# Patient Record
Sex: FEMALE | Race: WHITE | NOT HISPANIC OR LATINO | Employment: UNEMPLOYED | URBAN - METROPOLITAN AREA
[De-identification: names, ages, dates, MRNs, and addresses within clinical notes are randomized per-mention and may not be internally consistent; named-entity substitution may affect disease eponyms.]

---

## 2020-11-09 ENCOUNTER — TELEPHONE (OUTPATIENT)
Dept: ENDOCRINOLOGY | Facility: CLINIC | Age: 60
End: 2020-11-09

## 2020-11-09 RX ORDER — LEVOTHYROXINE SODIUM 88 UG/1
88 TABLET ORAL DAILY
Qty: 30 TABLET | Refills: 0 | Status: SHIPPED | OUTPATIENT
Start: 2020-11-09 | End: 2020-11-13 | Stop reason: SDUPTHER

## 2020-11-09 RX ORDER — LEVOTHYROXINE SODIUM 88 UG/1
88 TABLET ORAL DAILY
COMMUNITY
Start: 2020-08-05 | End: 2020-11-09 | Stop reason: SDUPTHER

## 2020-11-09 NOTE — TELEPHONE ENCOUNTER
PATIENT IS REQUESTING REFILL ON HER THYROID MEDICATION. SHE DOES NOT KNOW THE NAME OR DOSE OF THIS MEDICATION. PLEASE SEND TO [facility] [place].

## 2020-11-13 ENCOUNTER — OFFICE VISIT (OUTPATIENT)
Dept: ENDOCRINOLOGY | Facility: CLINIC | Age: 60
End: 2020-11-13

## 2020-11-13 VITALS
DIASTOLIC BLOOD PRESSURE: 78 MMHG | TEMPERATURE: 98 F | SYSTOLIC BLOOD PRESSURE: 144 MMHG | OXYGEN SATURATION: 98 % | BODY MASS INDEX: 32.92 KG/M2 | HEIGHT: 63 IN | HEART RATE: 85 BPM | WEIGHT: 185.8 LBS

## 2020-11-13 DIAGNOSIS — E11.65 UNCONTROLLED TYPE 2 DIABETES MELLITUS WITH HYPERGLYCEMIA (HCC): Primary | ICD-10-CM

## 2020-11-13 DIAGNOSIS — I10 BENIGN HYPERTENSION: ICD-10-CM

## 2020-11-13 DIAGNOSIS — Z79.4 INSULIN LONG-TERM USE (HCC): ICD-10-CM

## 2020-11-13 PROBLEM — E78.00 HYPERCHOLESTEREMIA: Status: ACTIVE | Noted: 2020-11-13

## 2020-11-13 PROBLEM — E03.9 HYPOTHYROIDISM (ACQUIRED): Status: ACTIVE | Noted: 2020-11-13

## 2020-11-13 LAB
EXPIRATION DATE: NORMAL
HBA1C MFR BLD: 8.3 %
Lab: NORMAL

## 2020-11-13 PROCEDURE — 83036 HEMOGLOBIN GLYCOSYLATED A1C: CPT | Performed by: INTERNAL MEDICINE

## 2020-11-13 PROCEDURE — 99213 OFFICE O/P EST LOW 20 MIN: CPT | Performed by: INTERNAL MEDICINE

## 2020-11-13 RX ORDER — INSULIN LISPRO 100 [IU]/ML
INJECTION, SOLUTION INTRAVENOUS; SUBCUTANEOUS
COMMUNITY
Start: 2020-09-09 | End: 2021-05-10 | Stop reason: SDUPTHER

## 2020-11-13 RX ORDER — AMLODIPINE BESYLATE 10 MG/1
10 TABLET ORAL DAILY
COMMUNITY
Start: 2020-09-11 | End: 2021-06-15 | Stop reason: SDUPTHER

## 2020-11-13 RX ORDER — INSULIN GLARGINE 100 [IU]/ML
INJECTION, SOLUTION SUBCUTANEOUS
COMMUNITY
Start: 2020-08-12 | End: 2020-11-23 | Stop reason: SDUPTHER

## 2020-11-13 RX ORDER — ASPIRIN 81 MG/1
81 TABLET ORAL DAILY
COMMUNITY

## 2020-11-13 RX ORDER — EMPAGLIFLOZIN 25 MG/1
1 TABLET, FILM COATED ORAL EVERY MORNING
COMMUNITY
Start: 2020-08-28 | End: 2020-12-03 | Stop reason: SDUPTHER

## 2020-11-13 RX ORDER — LISINOPRIL 40 MG/1
40 TABLET ORAL DAILY
COMMUNITY
Start: 2020-10-19 | End: 2021-01-18 | Stop reason: SDUPTHER

## 2020-11-13 RX ORDER — ROSUVASTATIN CALCIUM 10 MG/1
10 TABLET, COATED ORAL DAILY
COMMUNITY
Start: 2020-10-01 | End: 2021-07-02 | Stop reason: SDUPTHER

## 2020-11-13 RX ORDER — PEN NEEDLE, DIABETIC 32GX 5/32"
NEEDLE, DISPOSABLE MISCELLANEOUS 4 TIMES DAILY
COMMUNITY
Start: 2020-09-22 | End: 2021-03-16 | Stop reason: SDUPTHER

## 2020-11-13 RX ORDER — LEVOTHYROXINE SODIUM 88 UG/1
88 TABLET ORAL DAILY
Qty: 90 TABLET | Refills: 3 | Status: SHIPPED | OUTPATIENT
Start: 2020-11-13 | End: 2020-12-03 | Stop reason: SDUPTHER

## 2020-11-13 NOTE — ASSESSMENT & PLAN NOTE
Diabetes is unchanged.   Continue current treatment regimen.  Diabetes will be reassessed in 3 months.    A1c above goal.  Fasting FSBS okay.  Be more aggressive with mealtime insulin.

## 2020-11-13 NOTE — ASSESSMENT & PLAN NOTE
Hypertension is unchanged.  Continue current treatment regimen.  Blood pressure will be reassessed in 3 months.    BP borderline.  Check BP at home.

## 2020-11-13 NOTE — PROGRESS NOTES
"     Office Note      Date: 2020  Patient Name: Carmen Nava  MRN: [id number]  : 1960    Chief Complaint   Patient presents with   • Follow-up   • Diabetes   • Thyroid Problem       History of Present Illness:   Carmen Nava is a 59 y.o. female who presents for Diabetes type 2. Diagnosed in: . Treated in past with oral agents. Current treatments: jardiance and basal bolus insulin. Number of insulin shots per day: 4. Checks blood sugar 3 times a day. Has low blood sugar: no. Aspirin use: Yes. Statin use: Yes. ACE-I/ARB use: Yes. Changes in health since last visit: none. Last eye exam 2018.    Subjective      Diabetic Complications:  Eyes: No  Kidneys: No  Feet: No  Heart: No    Diet and Exercise:  Meals per day: 3  Minutes of exercise per week: 0 mins.    Review of Systems:   Review of Systems   Constitutional: Negative.    Cardiovascular: Negative.    Gastrointestinal: Negative.    Endocrine: Negative.        The following portions of the patient's history were reviewed and updated as appropriate: allergies, current medications, past family history, past medical history, past social history, past surgical history and problem list.    Objective       Visit Vitals  /78   Pulse 85   Temp 98 °F (36.7 °C) (Infrared)   Ht 160 cm (63\")   Wt 84.3 kg (185 lb 12.8 oz)   LMP 2014 (Approximate)   SpO2 98%   BMI 32.91 kg/m²       Physical Exam:  Physical Exam  Constitutional:       Appearance: Normal appearance.   Neurological:      Mental Status: She is alert.         Labs:    HbA1c  Lab Results   Component Value Date    HGBA1C 8.3 2020       CMP  No results found for: GLUCOSE, BUN, CREATININE, EGFRIFNONA, EGFRIFAFRI, BCR, K, CO2, CALCIUM, PROTENTOTREF, LABIL2, BILIRUBIN, AST, ALT     Lipid Panel        TSH  No results found for: TSH, FREET4     Hemoglobin A1C  Lab Results   Component Value Date    HGBA1C 8.3 2020        Microalbumin/Creatinine  No results found for: PRABHAKAR " CREATINIURIN, MICROALBUR        Assessment / Plan      Assessment & Plan:  Problem List Items Addressed This Visit        Cardiovascular and Mediastinum    Benign hypertension    Current Assessment & Plan     Hypertension is unchanged.  Continue current treatment regimen.  Blood pressure will be reassessed in 3 months.    BP borderline.  Check BP at home.         Relevant Medications    amLODIPine (NORVASC) 10 MG tablet    lisinopril (PRINIVIL,ZESTRIL) 40 MG tablet       Endocrine    Uncontrolled type 2 diabetes mellitus with hyperglycemia (CMS/HCC) - Primary    Current Assessment & Plan     Diabetes is unchanged.   Continue current treatment regimen.  Diabetes will be reassessed in 3 months.    A1c above goal.  Fasting FSBS okay.  Be more aggressive with mealtime insulin.         Relevant Medications    Jardiance 25 MG tablet    Lantus SoloStar 100 UNIT/ML injection pen    HumaLOG KwikPen 100 UNIT/ML solution pen-injector    Other Relevant Orders    POC Glycosylated Hemoglobin (Hb A1C) (Completed)       Other    Insulin long-term use (CMS/HCC)           Return in about 3 months (around 2/13/2021) for Recheck with A1c, TSH.    Julian Miranda MD   11/13/2020

## 2020-11-23 RX ORDER — INSULIN GLARGINE 100 [IU]/ML
25 INJECTION, SOLUTION SUBCUTANEOUS NIGHTLY
Qty: 8 ML | Refills: 2 | Status: SHIPPED | OUTPATIENT
Start: 2020-11-23 | End: 2021-02-10 | Stop reason: SDUPTHER

## 2020-12-03 RX ORDER — EMPAGLIFLOZIN 25 MG/1
1 TABLET, FILM COATED ORAL EVERY MORNING
Qty: 30 TABLET | Refills: 5 | Status: SHIPPED | OUTPATIENT
Start: 2020-12-03 | End: 2021-03-19 | Stop reason: SINTOL

## 2020-12-03 RX ORDER — LEVOTHYROXINE SODIUM 88 UG/1
88 TABLET ORAL DAILY
Qty: 90 TABLET | Refills: 3 | Status: SHIPPED | OUTPATIENT
Start: 2020-12-03 | End: 2021-03-22 | Stop reason: DRUGHIGH

## 2020-12-03 NOTE — TELEPHONE ENCOUNTER
Pt called needing a refill on Euthyrox 88 mcg, Jardiance 25 mg. Pt last seen 11/13/20 pt next appt 03/19/21

## 2021-01-19 RX ORDER — LISINOPRIL 40 MG/1
40 TABLET ORAL DAILY
Qty: 90 TABLET | Refills: 3 | Status: SHIPPED | OUTPATIENT
Start: 2021-01-19 | End: 2022-01-20

## 2021-02-10 RX ORDER — INSULIN GLARGINE 100 [IU]/ML
28 INJECTION, SOLUTION SUBCUTANEOUS DAILY
Qty: 3 PEN | Refills: 2 | Status: SHIPPED | OUTPATIENT
Start: 2021-02-10 | End: 2021-04-30 | Stop reason: SDUPTHER

## 2021-03-16 RX ORDER — PEN NEEDLE, DIABETIC 32GX 5/32"
1 NEEDLE, DISPOSABLE MISCELLANEOUS 4 TIMES DAILY
Qty: 400 EACH | Refills: 0 | Status: SHIPPED | OUTPATIENT
Start: 2021-03-16 | End: 2021-11-08

## 2021-03-19 ENCOUNTER — OFFICE VISIT (OUTPATIENT)
Dept: ENDOCRINOLOGY | Facility: CLINIC | Age: 61
End: 2021-03-19

## 2021-03-19 ENCOUNTER — LAB (OUTPATIENT)
Dept: LAB | Facility: HOSPITAL | Age: 61
End: 2021-03-19

## 2021-03-19 ENCOUNTER — TELEPHONE (OUTPATIENT)
Dept: ENDOCRINOLOGY | Facility: CLINIC | Age: 61
End: 2021-03-19

## 2021-03-19 VITALS
DIASTOLIC BLOOD PRESSURE: 74 MMHG | SYSTOLIC BLOOD PRESSURE: 140 MMHG | HEIGHT: 63 IN | WEIGHT: 187 LBS | TEMPERATURE: 96.9 F | HEART RATE: 80 BPM | BODY MASS INDEX: 33.13 KG/M2

## 2021-03-19 DIAGNOSIS — E03.9 HYPOTHYROIDISM (ACQUIRED): ICD-10-CM

## 2021-03-19 DIAGNOSIS — E11.65 UNCONTROLLED TYPE 2 DIABETES MELLITUS WITH HYPERGLYCEMIA (HCC): Primary | ICD-10-CM

## 2021-03-19 DIAGNOSIS — Z79.4 INSULIN LONG-TERM USE (HCC): ICD-10-CM

## 2021-03-19 DIAGNOSIS — I10 BENIGN HYPERTENSION: ICD-10-CM

## 2021-03-19 LAB
EXPIRATION DATE: NORMAL
HBA1C MFR BLD: 8.2 %
Lab: NORMAL

## 2021-03-19 PROCEDURE — 83036 HEMOGLOBIN GLYCOSYLATED A1C: CPT | Performed by: INTERNAL MEDICINE

## 2021-03-19 PROCEDURE — 84443 ASSAY THYROID STIM HORMONE: CPT

## 2021-03-19 PROCEDURE — 99214 OFFICE O/P EST MOD 30 MIN: CPT | Performed by: INTERNAL MEDICINE

## 2021-03-19 RX ORDER — SEMAGLUTIDE 1.34 MG/ML
0.5 INJECTION, SOLUTION SUBCUTANEOUS WEEKLY
Qty: 1.5 ML | Refills: 5 | Status: SHIPPED | OUTPATIENT
Start: 2021-03-19 | End: 2021-07-21 | Stop reason: DRUGHIGH

## 2021-03-19 RX ORDER — INSULIN GLARGINE 100 [IU]/ML
INJECTION, SOLUTION SUBCUTANEOUS
COMMUNITY
End: 2021-12-06 | Stop reason: SDUPTHER

## 2021-03-19 NOTE — PROGRESS NOTES
"     Office Note      Date: 2021  Patient Name: Carmen Nava  MRN: [id number]  : 1960    Chief Complaint   Patient presents with   • Diabetes       History of Present Illness:   Carmen Nava is a 60 y.o. female who presents for Diabetes type 2. Diagnosed in: . Treated in past with oral agents. Current treatments: jardiance and basal bolus insulin. Number of insulin shots per day: 4. Checks blood sugar 3 times a day. Has low blood sugar: rare. Aspirin use: Yes. Statin use: Yes. ACE-I/ARB use: Yes. Changes in health since last visit: none. Last eye exam 2018.    She remains on T4 88mcg qd.  She is taking this correctly.  She isn't taking any interfering meds concurrently.  She hasn't noted any change in the size of her neck.  She denies any compressive sxs.  She denies any sxs of hypo- or hyperthyroidism at this time.    Subjective      Diabetic Complications:  Eyes: No  Kidneys: No  Feet: No  Heart: No    Diet and Exercise:  Meals per day: 3  Minutes of exercise per week: 0 mins.    Review of Systems:   Review of Systems   Constitutional: Negative.    Cardiovascular: Negative.    Gastrointestinal: Negative.    Endocrine: Negative.        The following portions of the patient's history were reviewed and updated as appropriate: allergies, current medications, past family history, past medical history, past social history, past surgical history and problem list.    Objective       Visit Vitals  /74 (BP Location: Left arm, Patient Position: Sitting, Cuff Size: Adult)   Pulse 80   Temp 96.9 °F (36.1 °C) (Infrared)   Ht 160 cm (63\")   Wt 84.8 kg (187 lb)   LMP 2014 (Approximate)   BMI 33.13 kg/m²       Physical Exam:  Physical Exam  Constitutional:       Appearance: Normal appearance.   Neck:      Thyroid: No thyroid mass, thyromegaly or thyroid tenderness.   Lymphadenopathy:      Cervical: No cervical adenopathy.   Neurological:      Mental Status: She is alert.         Labs:    HbA1c  Lab " Results   Component Value Date    HGBA1C 8.2 03/19/2021       CMP  No results found for: GLUCOSE, BUN, CREATININE, EGFRIFNONA, EGFRIFAFRI, BCR, K, CO2, CALCIUM, PROTENTOTREF, LABIL2, BILIRUBIN, AST, ALT     Lipid Panel        TSH  No results found for: TSH, FREET4     Hemoglobin A1C  Lab Results   Component Value Date    HGBA1C 8.2 03/19/2021        Microalbumin/Creatinine  No results found for: MALBCRERATIO, CREATINIURIN, MICROALBUR        Assessment / Plan      Assessment & Plan:  Diagnoses and all orders for this visit:    1. Uncontrolled type 2 diabetes mellitus with hyperglycemia (CMS/[facility]) (Primary)  Assessment & Plan:  A1c unchanged.    Having yeast infections with Jardiance.  Will stop it.    We discussed GLP-1 RA tx.      Orders:  -     POC Glycosylated Hemoglobin (Hb A1C)    2. Benign hypertension  Assessment & Plan:  Hypertension is unchanged.  Continue current treatment regimen.  Blood pressure will be reassessed at the next regular appointment.      3. Hypothyroidism (acquired)  Assessment & Plan:  Continue T4.  Check TSH today.    Orders:  -     TSH; Future    4. Insulin long-term use (CMS/[facility])    Other orders  -     Semaglutide,0.25 or 0.5MG/DOS, (Ozempic, 0.25 or 0.5 MG/DOSE,) 2 MG/1.5ML solution pen-injector; Inject 0.5 mg under the skin into the appropriate area as directed 1 (One) Time Per Week.  Dispense: 1.5 mL; Refill: 5      Return in about 3 months (around 6/19/2021) for Recheck with A1c, CMP, lipids, TSH, microalbumin.    Julian Miranda MD   03/19/2021

## 2021-03-19 NOTE — TELEPHONE ENCOUNTER
PA Case: [id number], Status: Approved, Coverage Starts on: 3/19/2021 12:00:00 AM, Coverage Ends on: 3/19/2022 12:00:00 AM.

## 2021-03-19 NOTE — ASSESSMENT & PLAN NOTE
A1c unchanged.    Having yeast infections with Jardiance.  Will stop it.    We discussed GLP-1 RA tx.

## 2021-03-20 LAB — TSH SERPL DL<=0.05 MIU/L-ACNC: 4.3 UIU/ML (ref 0.27–4.2)

## 2021-03-22 RX ORDER — LEVOTHYROXINE SODIUM 0.1 MG/1
100 TABLET ORAL DAILY
Qty: 90 TABLET | Refills: 3 | Status: SHIPPED | OUTPATIENT
Start: 2021-03-22 | End: 2021-12-01 | Stop reason: DRUGHIGH

## 2021-04-30 RX ORDER — INSULIN GLARGINE 100 [IU]/ML
28 INJECTION, SOLUTION SUBCUTANEOUS DAILY
Qty: 26 ML | Refills: 1 | Status: SHIPPED | OUTPATIENT
Start: 2021-04-30 | End: 2021-07-21 | Stop reason: SDUPTHER

## 2021-04-30 RX ORDER — INSULIN GLARGINE 100 [IU]/ML
INJECTION, SOLUTION SUBCUTANEOUS
Status: CANCELLED | OUTPATIENT
Start: 2021-04-30

## 2021-04-30 NOTE — TELEPHONE ENCOUNTER
PATIENT STATES THAT SHE IS NOW OUT OF MEDICATION. STATS THAT PHARMACY SENT REFILL REQUEST PREVIOUSLY.

## 2021-05-10 RX ORDER — INSULIN LISPRO 100 [IU]/ML
24 INJECTION, SOLUTION INTRAVENOUS; SUBCUTANEOUS
Qty: 24 ML | Refills: 1 | Status: SHIPPED | OUTPATIENT
Start: 2021-05-10 | End: 2021-07-12 | Stop reason: SDUPTHER

## 2021-05-10 RX ORDER — INSULIN LISPRO 100 [IU]/ML
24 INJECTION, SOLUTION INTRAVENOUS; SUBCUTANEOUS
Qty: 24 ML | Refills: 1 | Status: SHIPPED | OUTPATIENT
Start: 2021-05-10 | End: 2021-05-10 | Stop reason: SDUPTHER

## 2021-05-10 NOTE — TELEPHONE ENCOUNTER
PT CALLED REQUESTING A REFILL OF HUMALOG TO BE SENT IN TO [facility]. PLEASE AND THANK YOU

## 2021-06-15 RX ORDER — AMLODIPINE BESYLATE 10 MG/1
10 TABLET ORAL DAILY
Qty: 90 TABLET | Refills: 2 | Status: SHIPPED | OUTPATIENT
Start: 2021-06-15 | End: 2022-01-17

## 2021-07-02 RX ORDER — ROSUVASTATIN CALCIUM 10 MG/1
10 TABLET, COATED ORAL DAILY
Qty: 90 TABLET | Refills: 0 | Status: SHIPPED | OUTPATIENT
Start: 2021-07-02 | End: 2021-09-30

## 2021-07-12 RX ORDER — INSULIN LISPRO 100 [IU]/ML
24 INJECTION, SOLUTION INTRAVENOUS; SUBCUTANEOUS
Qty: 24 ML | Refills: 5 | Status: SHIPPED | OUTPATIENT
Start: 2021-07-12 | End: 2021-07-21 | Stop reason: SDUPTHER

## 2021-07-12 NOTE — TELEPHONE ENCOUNTER
PT called back and said [facility] in [place] still doesn't have the Rx and she is in urgent need of insulin.    [facility] [place] pharmacy: [phone]

## 2021-07-13 NOTE — TELEPHONE ENCOUNTER
HumaLOG KwikPen 100 UNIT/ML solution pen-injector [[id number]]     Order Details  Dose: 24 Units Route: Subcutaneous Frequency: 3 Times Daily With Meals   Dispense Quantity: 24 mL Refills: 5          Sig: Inject 24 Units under the skin into the appropriate area as directed 3 (Three) Times a Day With Meals.         Start Date: 07/12/21 End Date: --   Written Date: 07/12/21 Expiration Date: 07/12/22   Original Order:  HumaLOG KwikPen 100 UNIT/ML solution pen-injector [[id number]]   Providers    Ordering Provider and Authorizing Provider:    Luba Russ PA   [address] [place] [address]   Phone:  [phone]   Fax:  [phone]   NPI:  [id number]        Ordering User:  Luba Russ PA        Pharmacy    [address] [phone] [place] [id number] [address] [address]   Phone:  [phone]  Fax:  [phone]   Orders with any of the following pharmaceutical classes: Antidiabetic    Name Dose Frequency Start Date End Date Medication Warnings Interventions? Order Mode    Insulin Glargine (Lantus SoloStar) 100 UNIT/ML injection pen       Outpatient    Semaglutide,0.25 or 0.5MG/DOS, (Ozempic, 0.25 or 0.5 MG/DOSE,) 2 MG/1.5ML solution pen-injector 0.5 mg Weekly 03/19/21    Outpatient    HumaLOG KwikPen 100 UNIT/ML solution pen-injector 24 Units 3 Times Daily With Meals 05/10/21 07/12/21 Dose  Outpatient    Warnings History    Total number of overridden warnings: 2   Full Warnings History   Pharmacist Clinical Review History    This prescription has not been clinically reviewed.   Order Reconciliation Actions       Order Reconciliation Actions   E-Prescribing Status    Outpatient Medication Detail    HumaLOG KwikPen 100 UNIT/ML solution pen-injector        Sig: Inject 24 Units under the skin into the appropriate area as directed 3 (Three) Times a Day With Meals.        Sent to pharmacy as: HumaLOG KwikPen 100  UNIT/ML Subcutaneous Solution Pen-injector        Class: Normal        Route: Subcutaneous        E-Prescribing Status: Receipt confirmed by pharmacy (7/12/2021  6:01 PM EDT)

## 2021-07-21 ENCOUNTER — OFFICE VISIT (OUTPATIENT)
Dept: ENDOCRINOLOGY | Facility: CLINIC | Age: 61
End: 2021-07-21

## 2021-07-21 ENCOUNTER — LAB (OUTPATIENT)
Dept: LAB | Facility: HOSPITAL | Age: 61
End: 2021-07-21

## 2021-07-21 VITALS
HEART RATE: 87 BPM | HEIGHT: 63 IN | WEIGHT: 182.4 LBS | OXYGEN SATURATION: 98 % | DIASTOLIC BLOOD PRESSURE: 76 MMHG | BODY MASS INDEX: 32.32 KG/M2 | SYSTOLIC BLOOD PRESSURE: 134 MMHG

## 2021-07-21 DIAGNOSIS — Z79.4 INSULIN LONG-TERM USE (HCC): ICD-10-CM

## 2021-07-21 DIAGNOSIS — I10 BENIGN HYPERTENSION: ICD-10-CM

## 2021-07-21 DIAGNOSIS — E11.65 UNCONTROLLED TYPE 2 DIABETES MELLITUS WITH HYPERGLYCEMIA (HCC): ICD-10-CM

## 2021-07-21 DIAGNOSIS — E11.65 UNCONTROLLED TYPE 2 DIABETES MELLITUS WITH HYPERGLYCEMIA (HCC): Primary | ICD-10-CM

## 2021-07-21 DIAGNOSIS — E78.00 HYPERCHOLESTEREMIA: ICD-10-CM

## 2021-07-21 DIAGNOSIS — E03.9 HYPOTHYROIDISM (ACQUIRED): ICD-10-CM

## 2021-07-21 LAB
ALBUMIN SERPL-MCNC: 4 G/DL (ref 3.5–5.2)
ALBUMIN UR-MCNC: <1.2 MG/DL
ALBUMIN/GLOB SERPL: 1.4 G/DL
ALP SERPL-CCNC: 113 U/L (ref 39–117)
ALT SERPL W P-5'-P-CCNC: 10 U/L (ref 1–33)
ANION GAP SERPL CALCULATED.3IONS-SCNC: 9.8 MMOL/L (ref 5–15)
AST SERPL-CCNC: 10 U/L (ref 1–32)
BILIRUB SERPL-MCNC: 0.7 MG/DL (ref 0–1.2)
BUN SERPL-MCNC: 18 MG/DL (ref 8–23)
BUN/CREAT SERPL: 20.7 (ref 7–25)
CALCIUM SPEC-SCNC: 9.8 MG/DL (ref 8.6–10.5)
CHLORIDE SERPL-SCNC: 102 MMOL/L (ref 98–107)
CHOLEST SERPL-MCNC: 164 MG/DL (ref 0–200)
CO2 SERPL-SCNC: 24.2 MMOL/L (ref 22–29)
CREAT SERPL-MCNC: 0.87 MG/DL (ref 0.57–1)
CREAT UR-MCNC: 43.2 MG/DL
EXPIRATION DATE: ABNORMAL
EXPIRATION DATE: NORMAL
GFR SERPL CREATININE-BSD FRML MDRD: 66 ML/MIN/1.73
GLOBULIN UR ELPH-MCNC: 2.9 GM/DL
GLUCOSE BLDC GLUCOMTR-MCNC: 392 MG/DL (ref 70–130)
GLUCOSE SERPL-MCNC: 397 MG/DL (ref 65–99)
HBA1C MFR BLD: 7.9 %
HDLC SERPL-MCNC: 57 MG/DL (ref 40–60)
LDLC SERPL CALC-MCNC: 88 MG/DL (ref 0–100)
LDLC/HDLC SERPL: 1.51 {RATIO}
Lab: ABNORMAL
Lab: NORMAL
MICROALBUMIN/CREAT UR: NORMAL MG/G{CREAT}
POTASSIUM SERPL-SCNC: 4.8 MMOL/L (ref 3.5–5.2)
PROT SERPL-MCNC: 6.9 G/DL (ref 6–8.5)
SODIUM SERPL-SCNC: 136 MMOL/L (ref 136–145)
TRIGL SERPL-MCNC: 106 MG/DL (ref 0–150)
TSH SERPL DL<=0.05 MIU/L-ACNC: 0.13 UIU/ML (ref 0.27–4.2)
VLDLC SERPL-MCNC: 19 MG/DL (ref 5–40)

## 2021-07-21 PROCEDURE — 82947 ASSAY GLUCOSE BLOOD QUANT: CPT | Performed by: INTERNAL MEDICINE

## 2021-07-21 PROCEDURE — 83036 HEMOGLOBIN GLYCOSYLATED A1C: CPT | Performed by: INTERNAL MEDICINE

## 2021-07-21 PROCEDURE — 99214 OFFICE O/P EST MOD 30 MIN: CPT | Performed by: INTERNAL MEDICINE

## 2021-07-21 PROCEDURE — 82570 ASSAY OF URINE CREATININE: CPT

## 2021-07-21 PROCEDURE — 84443 ASSAY THYROID STIM HORMONE: CPT

## 2021-07-21 PROCEDURE — 80053 COMPREHEN METABOLIC PANEL: CPT

## 2021-07-21 PROCEDURE — 80061 LIPID PANEL: CPT

## 2021-07-21 PROCEDURE — 82043 UR ALBUMIN QUANTITATIVE: CPT

## 2021-07-21 RX ORDER — INSULIN LISPRO 100 [IU]/ML
24 INJECTION, SOLUTION INTRAVENOUS; SUBCUTANEOUS
Qty: 66 ML | Refills: 3 | Status: SHIPPED | OUTPATIENT
Start: 2021-07-21 | End: 2021-11-30

## 2021-07-21 RX ORDER — SEMAGLUTIDE 1.34 MG/ML
1 INJECTION, SOLUTION SUBCUTANEOUS WEEKLY
Qty: 9 ML | Refills: 3 | Status: SHIPPED | OUTPATIENT
Start: 2021-07-21 | End: 2022-07-14 | Stop reason: SDUPTHER

## 2021-07-21 NOTE — ASSESSMENT & PLAN NOTE
Diabetes is improving with treatment.   Continue current treatment regimen.  Titrate up ozempic.  Diabetes will be reassessed in 3 months.

## 2021-07-21 NOTE — PROGRESS NOTES
"     Office Note      Date: 2021  Patient Name: Carmen Nava  MRN: [id number]  : 1960    Chief Complaint   Patient presents with   • Diabetes       History of Present Illness:   Carmen Nava is a 60 y.o. female who presents for Diabetes type 2. Diagnosed in: . Treated in past with oral agents. Current treatments: ozempic and basal bolus insulin. Number of insulin shots per day: 4. Checks blood sugar 3 times a day. Has low blood sugar: rare. Aspirin use: Yes. Statin use: Yes. ACE-I/ARB use: Yes. Changes in health since last visit: none. Last eye exam 2018.     She remains on T4 100mcg qd.  She is taking this correctly.  She isn't taking any interfering meds concurrently.  She hasn't noted any change in the size of her neck.  She denies any compressive sxs.  She denies any sxs of hypo- or hyperthyroidism at this time.    Subjective      Diabetic Complications:  Eyes: No  Kidneys: No  Feet: No  Heart: No    Diet and Exercise:  Meals per day: 3  Minutes of exercise per week: 0 mins.    Review of Systems:   Review of Systems   Constitutional: Negative.    Cardiovascular: Negative.    Gastrointestinal: Negative.    Endocrine: Negative.        The following portions of the patient's history were reviewed and updated as appropriate: allergies, current medications, past family history, past medical history, past social history, past surgical history and problem list.    Objective       Visit Vitals  /76   Pulse 87   Ht 160 cm (63\")   Wt 82.7 kg (182 lb 6.4 oz)   LMP 2014 (Approximate)   SpO2 98%   BMI 32.31 kg/m²       Physical Exam:  Physical Exam  Constitutional:       Appearance: Normal appearance.   Cardiovascular:      Pulses:           Dorsalis pedis pulses are 2+ on the right side and 2+ on the left side.        Posterior tibial pulses are 2+ on the right side and 2+ on the left side.   Feet:      Right foot:      Protective Sensation: 5 sites tested. 5 sites sensed.      Skin " integrity: Skin integrity normal.      Toenail Condition: Right toenails are abnormally thick.      Left foot:      Protective Sensation: 5 sites tested. 5 sites sensed.      Skin integrity: Skin integrity normal.      Toenail Condition: Left toenails are abnormally thick.   Neurological:      Mental Status: She is alert.         Labs:    HbA1c  Lab Results   Component Value Date    HGBA1C 7.9 07/21/2021       CMP  Lab Results   Component Value Date    GLUCOSE 397 (H) 07/21/2021    BUN 18 07/21/2021    CREATININE 0.87 07/21/2021    EGFRIFNONA 66 07/21/2021    BCR 20.7 07/21/2021    K 4.8 07/21/2021    CO2 24.2 07/21/2021    CALCIUM 9.8 07/21/2021    AST 10 07/21/2021    ALT 10 07/21/2021        Lipid Panel  Lab Results   Component Value Date    HDL 57 07/21/2021    LDL 88 07/21/2021    TRIG 106 07/21/2021        TSH  Lab Results   Component Value Date    TSH 0.132 (L) 07/21/2021        Hemoglobin A1C  Lab Results   Component Value Date    HGBA1C 7.9 07/21/2021        Microalbumin/Creatinine  Lab Results   Component Value Date    MALBCRERATIO  07/21/2021      Comment:      Unable to calculate    MICROALBUR <1.2 07/21/2021           Assessment / Plan      Assessment & Plan:  Diagnoses and all orders for this visit:    1. Uncontrolled type 2 diabetes mellitus with hyperglycemia (CMS/[facility]) (Primary)  Assessment & Plan:  Diabetes is improving with treatment.   Continue current treatment regimen.  Titrate up ozempic.  Diabetes will be reassessed in 3 months.    Orders:  -     POC Glycosylated Hemoglobin (Hb A1C)  -     POC Glucose, Blood  -     Comprehensive Metabolic Panel; Future  -     Lipid Panel; Future  -     Microalbumin / Creatinine Urine Ratio - Urine, Clean Catch; Future  -     TSH; Future    2. Benign hypertension  Assessment & Plan:  Hypertension is improving with treatment.  Continue current treatment regimen.  Blood pressure will be reassessed at the next regular appointment.      3.  Hypercholesteremia  Assessment & Plan:  Continue statin.  Check lipids today.      4. Hypothyroidism (acquired)  Assessment & Plan:  Continue T4.  Check TSH today.      5. Insulin long-term use (CMS/HCC)    Other orders  -     Semaglutide, 1 MG/DOSE, (Ozempic, 1 MG/DOSE,) 2 MG/1.5ML solution pen-injector; Inject 1 mg under the skin into the appropriate area as directed 1 (One) Time Per Week.  Dispense: 9 mL; Refill: 3  -     HumaLOG KwikPen 100 UNIT/ML solution pen-injector; Inject 24 Units under the skin into the appropriate area as directed 3 (Three) Times a Day With Meals.  Dispense: 66 mL; Refill: 3      Return in about 3 months (around 10/21/2021) for Recheck with A1c, TSH.    Julian Miranda MD   07/21/2021

## 2021-09-30 RX ORDER — ROSUVASTATIN CALCIUM 10 MG/1
TABLET, COATED ORAL
Qty: 90 TABLET | Refills: 0 | Status: SHIPPED | OUTPATIENT
Start: 2021-09-30 | End: 2021-12-28

## 2021-11-08 RX ORDER — PEN NEEDLE, DIABETIC 32GX 5/32"
NEEDLE, DISPOSABLE MISCELLANEOUS
Qty: 400 EACH | Refills: 0 | Status: SHIPPED | OUTPATIENT
Start: 2021-11-08 | End: 2022-05-18 | Stop reason: SDUPTHER

## 2021-11-30 ENCOUNTER — OFFICE VISIT (OUTPATIENT)
Dept: ENDOCRINOLOGY | Facility: CLINIC | Age: 61
End: 2021-11-30

## 2021-11-30 ENCOUNTER — LAB (OUTPATIENT)
Dept: LAB | Facility: HOSPITAL | Age: 61
End: 2021-11-30

## 2021-11-30 VITALS
DIASTOLIC BLOOD PRESSURE: 68 MMHG | OXYGEN SATURATION: 98 % | HEIGHT: 63 IN | HEART RATE: 90 BPM | SYSTOLIC BLOOD PRESSURE: 136 MMHG | BODY MASS INDEX: 30.65 KG/M2 | WEIGHT: 173 LBS

## 2021-11-30 DIAGNOSIS — E78.00 HYPERCHOLESTEREMIA: ICD-10-CM

## 2021-11-30 DIAGNOSIS — E11.65 UNCONTROLLED TYPE 2 DIABETES MELLITUS WITH HYPERGLYCEMIA (HCC): Primary | ICD-10-CM

## 2021-11-30 DIAGNOSIS — E03.9 HYPOTHYROIDISM (ACQUIRED): ICD-10-CM

## 2021-11-30 DIAGNOSIS — Z79.4 INSULIN LONG-TERM USE (HCC): ICD-10-CM

## 2021-11-30 DIAGNOSIS — I10 BENIGN HYPERTENSION: ICD-10-CM

## 2021-11-30 LAB
EXPIRATION DATE: ABNORMAL
EXPIRATION DATE: NORMAL
GLUCOSE BLDC GLUCOMTR-MCNC: 238 MG/DL (ref 70–130)
HBA1C MFR BLD: 8.9 %
Lab: ABNORMAL
Lab: NORMAL

## 2021-11-30 PROCEDURE — 99214 OFFICE O/P EST MOD 30 MIN: CPT | Performed by: INTERNAL MEDICINE

## 2021-11-30 PROCEDURE — 82947 ASSAY GLUCOSE BLOOD QUANT: CPT | Performed by: INTERNAL MEDICINE

## 2021-11-30 PROCEDURE — 84443 ASSAY THYROID STIM HORMONE: CPT

## 2021-11-30 PROCEDURE — 83036 HEMOGLOBIN GLYCOSYLATED A1C: CPT | Performed by: INTERNAL MEDICINE

## 2021-11-30 RX ORDER — INSULIN LISPRO 100 [IU]/ML
26 INJECTION, SOLUTION INTRAVENOUS; SUBCUTANEOUS
Qty: 72 ML | Refills: 3 | Status: SHIPPED | OUTPATIENT
Start: 2021-11-30 | End: 2022-06-17 | Stop reason: SDUPTHER

## 2021-11-30 NOTE — ASSESSMENT & PLAN NOTE
Diabetes is worsening. Fasting FSBS not too bad but higher during the day.  Titrate up insulin.  Increase mealtime insulin.  Diabetes will be reassessed in 3 months.

## 2021-11-30 NOTE — PROGRESS NOTES
"     Office Note      Date: 2021  Patient Name: Carmen Nava  MRN: [id number]  : 1960    Chief Complaint   Patient presents with   • Diabetes       History of Present Illness:   Carmen Nava is a 61 y.o. female who presents for Diabetes type 2. Diagnosed in: . Treated in past with oral agents. Current treatments: ozempic and basal bolus insulin. Number of insulin shots per day: 4. Checks blood sugar 3 times a day. Has low blood sugar: rare. Aspirin use: Yes. Statin use: Yes. ACE-I/ARB use: Yes. Changes in health since last visit: none. Last eye exam 2018.     She remains on T4 100mcg qd.  She is taking this correctly.  She isn't taking any interfering meds concurrently.  She hasn't noted any change in the size of her neck.  She denies any compressive sxs.  She denies any sxs of hypo- or hyperthyroidism at this time.    Subjective      Diabetic Complications:  Eyes: No  Kidneys: No  Feet: No  Heart: No    Diet and Exercise:  Meals per day: 3  Minutes of exercise per week: 0 mins.    Review of Systems:   Review of Systems   Constitutional: Negative.    Cardiovascular: Negative.    Gastrointestinal: Negative.    Endocrine: Negative.        The following portions of the patient's history were reviewed and updated as appropriate: allergies, current medications, past family history, past medical history, past social history, past surgical history and problem list.    Objective       Visit Vitals  /68 (BP Location: Left arm, Patient Position: Sitting, Cuff Size: Adult)   Pulse 90   Ht 160 cm (63\")   Wt 78.5 kg (173 lb)   LMP 2014 (Approximate)   SpO2 98%   BMI 30.65 kg/m²       Physical Exam:  Physical Exam  Constitutional:       Appearance: Normal appearance.   Neurological:      Mental Status: She is alert.         Labs:    HbA1c  Lab Results   Component Value Date    HGBA1C 8.9 2021       CMP  Lab Results   Component Value Date    GLUCOSE 397 (H) 2021    BUN 18 2021    " CREATININE 0.87 07/21/2021    EGFRIFNONA 66 07/21/2021    BCR 20.7 07/21/2021    K 4.8 07/21/2021    CO2 24.2 07/21/2021    CALCIUM 9.8 07/21/2021    AST 10 07/21/2021    ALT 10 07/21/2021        Lipid Panel  Lab Results   Component Value Date    HDL 57 07/21/2021    LDL 88 07/21/2021    TRIG 106 07/21/2021        TSH  Lab Results   Component Value Date    TSH 0.132 (L) 07/21/2021        Hemoglobin A1C  Lab Results   Component Value Date    HGBA1C 8.9 11/30/2021        Microalbumin/Creatinine  Lab Results   Component Value Date    MALBCRERATIO  07/21/2021      Comment:      Unable to calculate    MICROALBUR <1.2 07/21/2021           Assessment / Plan      Assessment & Plan:  Diagnoses and all orders for this visit:    1. Uncontrolled type 2 diabetes mellitus with hyperglycemia (HCC) (Primary)  Assessment & Plan:  Diabetes is worsening. Fasting FSBS not too bad but higher during the day.  Titrate up insulin.  Increase mealtime insulin.  Diabetes will be reassessed in 3 months.    Orders:  -     POC Glycosylated Hemoglobin (Hb A1C)  -     POC Glucose, Blood    2. Benign hypertension  Assessment & Plan:  Hypertension is unchanged.  Continue current treatment regimen.  Blood pressure will be reassessed at the next regular appointment.      3. Hypercholesteremia  Assessment & Plan:  Lipids okay last visit.  Continue statin.      4. Hypothyroidism (acquired)  Assessment & Plan:  Continue T4.  Check TSH today.    Orders:  -     TSH; Future    5. Insulin long-term use (HCC)    Other orders  -     HumaLOG KwikPen 100 UNIT/ML solution pen-injector; Inject 26 Units under the skin into the appropriate area as directed 3 (Three) Times a Day With Meals.  Dispense: 72 mL; Refill: 3      Return in about 3 months (around 2/28/2022) for Recheck with A1c, TSH.    Julian Miranda MD   11/30/2021

## 2021-12-01 ENCOUNTER — DOCUMENTATION (OUTPATIENT)
Dept: ENDOCRINOLOGY | Facility: CLINIC | Age: 61
End: 2021-12-01

## 2021-12-01 LAB — TSH SERPL DL<=0.05 MIU/L-ACNC: 0.09 UIU/ML (ref 0.27–4.2)

## 2021-12-01 RX ORDER — LEVOTHYROXINE SODIUM 88 UG/1
88 TABLET ORAL DAILY
Qty: 90 TABLET | Refills: 3 | Status: SHIPPED | OUTPATIENT
Start: 2021-12-01 | End: 2022-11-16

## 2021-12-06 RX ORDER — INSULIN GLARGINE 100 [IU]/ML
20-30 INJECTION, SOLUTION SUBCUTANEOUS DAILY
Qty: 27 ML | Refills: 1 | Status: SHIPPED | OUTPATIENT
Start: 2021-12-06 | End: 2022-07-11 | Stop reason: SDUPTHER

## 2021-12-28 RX ORDER — ROSUVASTATIN CALCIUM 10 MG/1
TABLET, COATED ORAL
Qty: 90 TABLET | Refills: 1 | Status: SHIPPED | OUTPATIENT
Start: 2021-12-28 | End: 2022-06-28

## 2022-01-17 RX ORDER — AMLODIPINE BESYLATE 10 MG/1
TABLET ORAL
Qty: 90 TABLET | Refills: 0 | Status: SHIPPED | OUTPATIENT
Start: 2022-01-17

## 2022-01-20 RX ORDER — LISINOPRIL 40 MG/1
TABLET ORAL
Qty: 90 TABLET | Refills: 1 | Status: SHIPPED | OUTPATIENT
Start: 2022-01-20 | End: 2022-05-12

## 2022-03-07 ENCOUNTER — PRIOR AUTHORIZATION (OUTPATIENT)
Dept: ENDOCRINOLOGY | Facility: CLINIC | Age: 62
End: 2022-03-07

## 2022-03-07 NOTE — TELEPHONE ENCOUNTER
Carmen Nava (Key: [id number])  Ozempic (0.25 or 0.5 MG/DOSE) 2MG/1.5ML pen-injectors     Form  [employer] [employer] PA Form (2017 [employer])  Created  8 hours ago  Sent to Plan  less than a minute ago  Plan Response  less than a minute ago  Submit Clinical Questions  Determination  N/A  Message from Plan  Available without authorization.

## 2022-05-12 ENCOUNTER — LAB (OUTPATIENT)
Dept: LAB | Facility: HOSPITAL | Age: 62
End: 2022-05-12

## 2022-05-12 ENCOUNTER — OFFICE VISIT (OUTPATIENT)
Dept: ENDOCRINOLOGY | Facility: CLINIC | Age: 62
End: 2022-05-12

## 2022-05-12 VITALS
BODY MASS INDEX: 29.41 KG/M2 | SYSTOLIC BLOOD PRESSURE: 140 MMHG | HEIGHT: 63 IN | OXYGEN SATURATION: 97 % | DIASTOLIC BLOOD PRESSURE: 62 MMHG | HEART RATE: 71 BPM | WEIGHT: 166 LBS

## 2022-05-12 DIAGNOSIS — E11.65 UNCONTROLLED TYPE 2 DIABETES MELLITUS WITH HYPERGLYCEMIA: Primary | ICD-10-CM

## 2022-05-12 DIAGNOSIS — I10 BENIGN HYPERTENSION: ICD-10-CM

## 2022-05-12 DIAGNOSIS — E78.00 HYPERCHOLESTEREMIA: ICD-10-CM

## 2022-05-12 DIAGNOSIS — E03.9 HYPOTHYROIDISM (ACQUIRED): ICD-10-CM

## 2022-05-12 DIAGNOSIS — Z79.4 INSULIN LONG-TERM USE: ICD-10-CM

## 2022-05-12 LAB
EXPIRATION DATE: ABNORMAL
EXPIRATION DATE: NORMAL
GLUCOSE BLDC GLUCOMTR-MCNC: 432 MG/DL (ref 70–130)
HBA1C MFR BLD: 8.3 %
Lab: ABNORMAL
Lab: NORMAL
TSH SERPL DL<=0.05 MIU/L-ACNC: 0.37 UIU/ML (ref 0.27–4.2)

## 2022-05-12 PROCEDURE — 84443 ASSAY THYROID STIM HORMONE: CPT

## 2022-05-12 PROCEDURE — 82947 ASSAY GLUCOSE BLOOD QUANT: CPT | Performed by: INTERNAL MEDICINE

## 2022-05-12 PROCEDURE — 83036 HEMOGLOBIN GLYCOSYLATED A1C: CPT | Performed by: INTERNAL MEDICINE

## 2022-05-12 PROCEDURE — 99214 OFFICE O/P EST MOD 30 MIN: CPT | Performed by: INTERNAL MEDICINE

## 2022-05-12 RX ORDER — LOSARTAN POTASSIUM 50 MG/1
50 TABLET ORAL DAILY
COMMUNITY
Start: 2022-04-19 | End: 2022-09-30 | Stop reason: DRUGHIGH

## 2022-05-12 NOTE — ASSESSMENT & PLAN NOTE
Diabetes is improving with treatment.   Continue to titrate insulin.  Diabetes will be reassessed in 3 months.

## 2022-05-12 NOTE — PROGRESS NOTES
"     Office Note      Date: 2022  Patient Name: Carmen Nava  MRN: [id number]  : 1960    Chief Complaint   Patient presents with   • Diabetes       History of Present Illness:   Carmen Nava is a 61 y.o. female who presents for Diabetes type 2. Diagnosed in: . Treated in past with oral agents. Current treatments: ozempic and basal bolus insulin. Number of insulin shots per day: 4. Checks blood sugar 3 times a day. Has low blood sugar: rare. Aspirin use: Yes. Statin use: Yes. ACE-I/ARB use: Yes. Changes in health since last visit: none. Last eye exam 2018.     She remains on T4 100mcg qd.  She is taking this correctly.  She isn't taking any interfering meds concurrently.  She hasn't noted any change in the size of her neck.  She denies any compressive sxs.  She denies any sxs of hypo- or hyperthyroidism at this time.    Subjective      Diabetic Complications:  Eyes: No  Kidneys: No  Feet: No  Heart: No    Diet and Exercise:  Meals per day: 3  Minutes of exercise per week: 0 mins.    Review of Systems:   Review of Systems   Constitutional: Negative.    Cardiovascular: Negative.    Gastrointestinal: Negative.    Endocrine: Negative.        The following portions of the patient's history were reviewed and updated as appropriate: allergies, current medications, past family history, past medical history, past social history, past surgical history and problem list.    Objective       Visit Vitals  /62   Pulse 71   Ht 160 cm (63\")   Wt 75.3 kg (166 lb)   LMP 2014 (Approximate)   SpO2 97%   BMI 29.41 kg/m²       Physical Exam:  Physical Exam  Constitutional:       Appearance: Normal appearance.   Neurological:      Mental Status: She is alert.         Labs:    HbA1c  Lab Results   Component Value Date    HGBA1C 8.3 2022       CMP  Lab Results   Component Value Date    GLUCOSE 397 (H) 2021    BUN 18 2021    CREATININE 0.87 2021    EGFRIFNONA 66 2021    BCR 20.7 " 07/21/2021    K 4.8 07/21/2021    CO2 24.2 07/21/2021    CALCIUM 9.8 07/21/2021    AST 10 07/21/2021    ALT 10 07/21/2021        Lipid Panel  Lab Results   Component Value Date    HDL 57 07/21/2021    LDL 88 07/21/2021    TRIG 106 07/21/2021        TSH  Lab Results   Component Value Date    TSH 0.089 (L) 11/30/2021        Hemoglobin A1C  Lab Results   Component Value Date    HGBA1C 8.3 05/12/2022        Microalbumin/Creatinine  Lab Results   Component Value Date    MALBCRERATIO  07/21/2021      Comment:      Unable to calculate    MICROALBUR <1.2 07/21/2021           Assessment / Plan      Assessment & Plan:  Diagnoses and all orders for this visit:    1. Uncontrolled type 2 diabetes mellitus with hyperglycemia (HCC) (Primary)  Assessment & Plan:  Diabetes is improving with treatment.   Continue to titrate insulin.  Diabetes will be reassessed in 3 months.    Orders:  -     POC Glucose, Blood  -     POC Glycosylated Hemoglobin (Hb A1C)    2. Benign hypertension  Assessment & Plan:  Hypertension is unchanged.  SBP borderline.  DBP is low.  Continue current treatment regimen.  Monitor BP at home.  Blood pressure will be reassessed at the next regular appointment.      3. Hypercholesteremia  Assessment & Plan:  Continue statin.  Plan to check lipids next visit.      4. Hypothyroidism (acquired)  Assessment & Plan:  Continue T4.  Check TSH today.    Orders:  -     TSH; Future    5. Insulin long-term use (HCC)      Return in about 3 months (around 8/12/2022) for Recheck with A1c, CMP, lipids, TSH, microalbumin, foot exam.    Julian Miranda MD   05/12/2022

## 2022-05-12 NOTE — ASSESSMENT & PLAN NOTE
Hypertension is unchanged.  SBP borderline.  DBP is low.  Continue current treatment regimen.  Monitor BP at home.  Blood pressure will be reassessed at the next regular appointment.

## 2022-05-18 RX ORDER — PEN NEEDLE, DIABETIC 32GX 5/32"
NEEDLE, DISPOSABLE MISCELLANEOUS
Qty: 400 EACH | Refills: 3 | Status: SHIPPED | OUTPATIENT
Start: 2022-05-18

## 2022-06-17 RX ORDER — INSULIN LISPRO 100 [IU]/ML
26 INJECTION, SOLUTION INTRAVENOUS; SUBCUTANEOUS
Qty: 72 ML | Refills: 1 | Status: SHIPPED | OUTPATIENT
Start: 2022-06-17

## 2022-06-28 RX ORDER — ROSUVASTATIN CALCIUM 10 MG/1
TABLET, COATED ORAL
Qty: 90 TABLET | Refills: 3 | Status: SHIPPED | OUTPATIENT
Start: 2022-06-28

## 2022-07-11 RX ORDER — INSULIN GLARGINE 100 [IU]/ML
20-30 INJECTION, SOLUTION SUBCUTANEOUS DAILY
Qty: 27 ML | Refills: 1 | Status: SHIPPED | OUTPATIENT
Start: 2022-07-11 | End: 2022-07-14 | Stop reason: SDUPTHER

## 2022-07-14 RX ORDER — INSULIN GLARGINE 100 [IU]/ML
20-30 INJECTION, SOLUTION SUBCUTANEOUS DAILY
Qty: 27 ML | Refills: 1 | Status: SHIPPED | OUTPATIENT
Start: 2022-07-14

## 2022-07-14 RX ORDER — SEMAGLUTIDE 1.34 MG/ML
1 INJECTION, SOLUTION SUBCUTANEOUS WEEKLY
Qty: 9 ML | Refills: 3 | Status: SHIPPED | OUTPATIENT
Start: 2022-07-14 | End: 2022-09-30 | Stop reason: DRUGHIGH

## 2022-09-30 ENCOUNTER — OFFICE VISIT (OUTPATIENT)
Dept: ENDOCRINOLOGY | Facility: CLINIC | Age: 62
End: 2022-09-30

## 2022-09-30 ENCOUNTER — LAB (OUTPATIENT)
Dept: LAB | Facility: HOSPITAL | Age: 62
End: 2022-09-30

## 2022-09-30 VITALS
DIASTOLIC BLOOD PRESSURE: 64 MMHG | HEART RATE: 93 BPM | WEIGHT: 170 LBS | SYSTOLIC BLOOD PRESSURE: 172 MMHG | HEIGHT: 63 IN | OXYGEN SATURATION: 98 % | BODY MASS INDEX: 30.12 KG/M2

## 2022-09-30 DIAGNOSIS — E11.65 UNCONTROLLED TYPE 2 DIABETES MELLITUS WITH HYPERGLYCEMIA: ICD-10-CM

## 2022-09-30 DIAGNOSIS — E11.649 TYPE 2 DIABETES MELLITUS WITH HYPOGLYCEMIA WITHOUT COMA, WITH LONG-TERM CURRENT USE OF INSULIN: ICD-10-CM

## 2022-09-30 DIAGNOSIS — Z79.4 INSULIN LONG-TERM USE: ICD-10-CM

## 2022-09-30 DIAGNOSIS — E03.9 HYPOTHYROIDISM (ACQUIRED): ICD-10-CM

## 2022-09-30 DIAGNOSIS — E11.65 UNCONTROLLED TYPE 2 DIABETES MELLITUS WITH HYPERGLYCEMIA: Primary | ICD-10-CM

## 2022-09-30 DIAGNOSIS — E78.00 HYPERCHOLESTEREMIA: ICD-10-CM

## 2022-09-30 DIAGNOSIS — Z79.4 TYPE 2 DIABETES MELLITUS WITH HYPOGLYCEMIA WITHOUT COMA, WITH LONG-TERM CURRENT USE OF INSULIN: ICD-10-CM

## 2022-09-30 DIAGNOSIS — I10 BENIGN HYPERTENSION: ICD-10-CM

## 2022-09-30 LAB
ALBUMIN UR-MCNC: <1.2 MG/DL
CREAT UR-MCNC: 127 MG/DL
EXPIRATION DATE: ABNORMAL
EXPIRATION DATE: NORMAL
GLUCOSE BLDC GLUCOMTR-MCNC: 137 MG/DL (ref 70–130)
HBA1C MFR BLD: 8.4 %
Lab: ABNORMAL
Lab: NORMAL
MICROALBUMIN/CREAT UR: NORMAL MG/G{CREAT}

## 2022-09-30 PROCEDURE — 82570 ASSAY OF URINE CREATININE: CPT

## 2022-09-30 PROCEDURE — 80061 LIPID PANEL: CPT

## 2022-09-30 PROCEDURE — 99214 OFFICE O/P EST MOD 30 MIN: CPT | Performed by: INTERNAL MEDICINE

## 2022-09-30 PROCEDURE — 82043 UR ALBUMIN QUANTITATIVE: CPT

## 2022-09-30 PROCEDURE — 80053 COMPREHEN METABOLIC PANEL: CPT

## 2022-09-30 PROCEDURE — 83036 HEMOGLOBIN GLYCOSYLATED A1C: CPT | Performed by: INTERNAL MEDICINE

## 2022-09-30 PROCEDURE — 84443 ASSAY THYROID STIM HORMONE: CPT

## 2022-09-30 PROCEDURE — 82947 ASSAY GLUCOSE BLOOD QUANT: CPT | Performed by: INTERNAL MEDICINE

## 2022-09-30 RX ORDER — LOSARTAN POTASSIUM 100 MG/1
100 TABLET ORAL DAILY
Qty: 90 TABLET | Refills: 3 | Status: SHIPPED | OUTPATIENT
Start: 2022-09-30

## 2022-09-30 RX ORDER — SEMAGLUTIDE 2.68 MG/ML
2 INJECTION, SOLUTION SUBCUTANEOUS
Qty: 6 ML | Refills: 3 | Status: SHIPPED | OUTPATIENT
Start: 2022-09-30

## 2022-09-30 NOTE — PROGRESS NOTES
"     Office Note      Date: 2022  Patient Name: Carmen Nava  MRN: [id number]  : 1960    Chief Complaint   Patient presents with   • Diabetes       History of Present Illness:   Carmen Nava is a 61 y.o. female who presents for Diabetes type 2. Diagnosed in: . Treated in past with oral agents. Current treatments: ozempic and basal bolus insulin. Number of insulin shots per day: 4. Checks blood sugar 3 times a day. Has low blood sugar: frequent - at bedtime. Aspirin use: Yes. Statin use: Yes. ACE-I/ARB use: Yes. Changes in health since last visit: none. Last eye exam 2018.     She remains on T4 88mcg qd.  She is taking this correctly.  She isn't taking any interfering meds concurrently.  She hasn't noted any change in the size of her neck.  She denies any compressive sxs.  She denies any sxs of hypo- or hyperthyroidism at this time.    Subjective      Diabetic Complications:  Eyes: No  Kidneys: No  Feet: No  Heart: No    Diet and Exercise:  Meals per day: 3  Minutes of exercise per week: 0 mins.    Review of Systems:   Review of Systems   Constitutional: Negative.    Cardiovascular: Negative.    Gastrointestinal: Negative.    Endocrine: Negative.        The following portions of the patient's history were reviewed and updated as appropriate: allergies, current medications, past family history, past medical history, past social history, past surgical history and problem list.    Objective       Visit Vitals  /64   Pulse 93   Ht 160 cm (63\")   Wt 77.1 kg (170 lb)   LMP 2014 (Approximate)   SpO2 98%   BMI 30.11 kg/m²       Physical Exam:  Physical Exam  Constitutional:       Appearance: Normal appearance.   Cardiovascular:      Pulses:           Dorsalis pedis pulses are 2+ on the right side and 2+ on the left side.        Posterior tibial pulses are 2+ on the right side and 2+ on the left side.   Feet:      Right foot:      Protective Sensation: 5 sites tested. 5 sites sensed.      Skin " integrity: Skin integrity normal.      Toenail Condition: Right toenails are abnormally thick.      Left foot:      Protective Sensation: 5 sites tested. 5 sites sensed.      Skin integrity: Skin integrity normal.      Toenail Condition: Left toenails are abnormally thick.   Neurological:      Mental Status: She is alert.         Labs:    HbA1c  Lab Results   Component Value Date    HGBA1C 8.4 09/30/2022       CMP  Lab Results   Component Value Date    GLUCOSE 397 (H) 07/21/2021    BUN 18 07/21/2021    CREATININE 0.87 07/21/2021    EGFRIFNONA 66 07/21/2021    BCR 20.7 07/21/2021    K 4.8 07/21/2021    CO2 24.2 07/21/2021    CALCIUM 9.8 07/21/2021    AST 10 07/21/2021    ALT 10 07/21/2021        Lipid Panel  Lab Results   Component Value Date    HDL 57 07/21/2021    LDL 88 07/21/2021    TRIG 106 07/21/2021        TSH  Lab Results   Component Value Date    TSH 0.370 05/12/2022        Hemoglobin A1C  Lab Results   Component Value Date    HGBA1C 8.4 09/30/2022        Microalbumin/Creatinine  Lab Results   Component Value Date    MALBCRERATIO  07/21/2021      Comment:      Unable to calculate    MICROALBUR <1.2 07/21/2021           Assessment / Plan      Assessment & Plan:  Diagnoses and all orders for this visit:    1. Uncontrolled type 2 diabetes mellitus with hyperglycemia (HCC) (Primary)  Assessment & Plan:  Diabetes is unchanged.  A1c above goal at 8.4%.  Continue current treatment regimen.  But titrate up ozempic.  Diabetes will be reassessed in 3 months.    Orders:  -     POC Glucose, Blood  -     POC Glycosylated Hemoglobin (Hb A1C)  -     Comprehensive Metabolic Panel; Future  -     TSH; Future  -     Lipid Panel; Future  -     Microalbumin / Creatinine Urine Ratio - Urine, Clean Catch; Future    2. Benign hypertension  Assessment & Plan:  Hypertension is worsening.  Continue current treatment regimen.  But increase losartan dose.  Blood pressure will be reassessed at the next regular appointment.      3.  Hypercholesteremia  Assessment & Plan:  Continue statin.  Check nonfasting lipids today.  Will send note about results.      4. Hypothyroidism (acquired)  Assessment & Plan:  Continue T4 tx.  Check TSH.      5. Insulin long-term use (HCC)    6. Type 2 diabetes mellitus with hypoglycemia without coma, with long-term current use of insulin (HCC)  Assessment & Plan:  Having some hypoglycemia around bedtime.  Decrease supper dose of humalog.      Other orders  -     losartan (COZAAR) 100 MG tablet; Take 1 tablet by mouth Daily.  Dispense: 90 tablet; Refill: 3  -     Semaglutide, 2 MG/DOSE, (Ozempic, 2 MG/DOSE,) 8 MG/3ML solution pen-injector; Inject 2 mg under the skin into the appropriate area as directed Every 7 (Seven) Days.  Dispense: 6 mL; Refill: 3      Return in about 3 months (around 12/30/2022) for Recheck with A1c.    Julian Miranda MD   09/30/2022

## 2022-09-30 NOTE — ASSESSMENT & PLAN NOTE
Diabetes is unchanged.  A1c above goal at 8.4%.  Continue current treatment regimen.  But titrate up ozempic.  Diabetes will be reassessed in 3 months.

## 2022-09-30 NOTE — ASSESSMENT & PLAN NOTE
Hypertension is worsening.  Continue current treatment regimen.  But increase losartan dose.  Blood pressure will be reassessed at the next regular appointment.

## 2022-10-01 LAB
ALBUMIN SERPL-MCNC: 4.3 G/DL (ref 3.5–5.2)
ALBUMIN/GLOB SERPL: 1.7 G/DL
ALP SERPL-CCNC: 123 U/L (ref 39–117)
ALT SERPL W P-5'-P-CCNC: 14 U/L (ref 1–33)
ANION GAP SERPL CALCULATED.3IONS-SCNC: 10 MMOL/L (ref 5–15)
AST SERPL-CCNC: 15 U/L (ref 1–32)
BILIRUB SERPL-MCNC: 0.5 MG/DL (ref 0–1.2)
BUN SERPL-MCNC: 23 MG/DL (ref 8–23)
BUN/CREAT SERPL: 20.5 (ref 7–25)
CALCIUM SPEC-SCNC: 10.4 MG/DL (ref 8.6–10.5)
CHLORIDE SERPL-SCNC: 104 MMOL/L (ref 98–107)
CHOLEST SERPL-MCNC: 168 MG/DL (ref 0–200)
CO2 SERPL-SCNC: 26 MMOL/L (ref 22–29)
CREAT SERPL-MCNC: 1.12 MG/DL (ref 0.57–1)
EGFRCR SERPLBLD CKD-EPI 2021: 55.4 ML/MIN/1.73
GLOBULIN UR ELPH-MCNC: 2.5 GM/DL
GLUCOSE SERPL-MCNC: 89 MG/DL (ref 65–99)
HDLC SERPL-MCNC: 74 MG/DL (ref 40–60)
LDLC SERPL CALC-MCNC: 76 MG/DL (ref 0–100)
LDLC/HDLC SERPL: 0.99 {RATIO}
POTASSIUM SERPL-SCNC: 4.4 MMOL/L (ref 3.5–5.2)
PROT SERPL-MCNC: 6.8 G/DL (ref 6–8.5)
SODIUM SERPL-SCNC: 140 MMOL/L (ref 136–145)
TRIGL SERPL-MCNC: 103 MG/DL (ref 0–150)
TSH SERPL DL<=0.05 MIU/L-ACNC: 0.52 UIU/ML (ref 0.27–4.2)
VLDLC SERPL-MCNC: 18 MG/DL (ref 5–40)

## 2022-11-16 RX ORDER — LEVOTHYROXINE SODIUM 88 UG/1
TABLET ORAL
Qty: 90 TABLET | Refills: 1 | Status: SHIPPED | OUTPATIENT
Start: 2022-11-16

## 2023-02-06 ENCOUNTER — OFFICE VISIT (OUTPATIENT)
Dept: ENDOCRINOLOGY | Facility: CLINIC | Age: 63
End: 2023-02-06
Payer: COMMERCIAL

## 2023-02-06 VITALS
HEIGHT: 63 IN | BODY MASS INDEX: 29.62 KG/M2 | WEIGHT: 167.2 LBS | DIASTOLIC BLOOD PRESSURE: 58 MMHG | OXYGEN SATURATION: 98 % | HEART RATE: 88 BPM | SYSTOLIC BLOOD PRESSURE: 130 MMHG

## 2023-02-06 DIAGNOSIS — E78.00 HYPERCHOLESTEREMIA: ICD-10-CM

## 2023-02-06 DIAGNOSIS — E11.65 UNCONTROLLED TYPE 2 DIABETES MELLITUS WITH HYPERGLYCEMIA: Primary | ICD-10-CM

## 2023-02-06 DIAGNOSIS — I10 BENIGN HYPERTENSION: ICD-10-CM

## 2023-02-06 DIAGNOSIS — Z79.4 INSULIN LONG-TERM USE: ICD-10-CM

## 2023-02-06 DIAGNOSIS — E03.9 HYPOTHYROIDISM (ACQUIRED): ICD-10-CM

## 2023-02-06 DIAGNOSIS — E11.649 TYPE 2 DIABETES MELLITUS WITH HYPOGLYCEMIA WITHOUT COMA, WITH LONG-TERM CURRENT USE OF INSULIN: ICD-10-CM

## 2023-02-06 DIAGNOSIS — Z79.4 TYPE 2 DIABETES MELLITUS WITH HYPOGLYCEMIA WITHOUT COMA, WITH LONG-TERM CURRENT USE OF INSULIN: ICD-10-CM

## 2023-02-06 LAB
EXPIRATION DATE: NORMAL
EXPIRATION DATE: NORMAL
GLUCOSE BLDC GLUCOMTR-MCNC: 119 MG/DL (ref 70–130)
HBA1C MFR BLD: 8.4 %
Lab: NORMAL
Lab: NORMAL

## 2023-02-06 PROCEDURE — 82947 ASSAY GLUCOSE BLOOD QUANT: CPT | Performed by: INTERNAL MEDICINE

## 2023-02-06 PROCEDURE — 83036 HEMOGLOBIN GLYCOSYLATED A1C: CPT | Performed by: INTERNAL MEDICINE

## 2023-02-06 PROCEDURE — 99214 OFFICE O/P EST MOD 30 MIN: CPT | Performed by: INTERNAL MEDICINE

## 2023-02-06 NOTE — ASSESSMENT & PLAN NOTE
She notes lower bedtime and fasting glucose readings.  No severe lows.  Will send Rx for FreeStyle Cheryl 2 to see if this is covered.

## 2023-02-06 NOTE — PROGRESS NOTES
"     Office Note      Date: 2023  Patient Name: Carmen Nava  MRN: [id number]  : 1960    Chief Complaint   Patient presents with   • Diabetes       History of Present Illness:   Carmen Nava is a 61 y.o. female who presents for Diabetes type 2. Diagnosed in: . Treated in past with oral agents. Current treatments: ozempic and basal bolus insulin. Number of insulin shots per day: 4. Checks blood sugar 3 times a day. Has low blood sugar: frequent - at bedtime. Aspirin use: Yes. Statin use: Yes. ACE-I/ARB use: Yes. Changes in health since last visit: none. Last eye exam 2022.     She remains on T4 88mcg qd.  She is taking this correctly.  She isn't taking any interfering meds concurrently.  She hasn't noted any change in the size of her neck.  She denies any compressive sxs.  She denies any sxs of hypo- or hyperthyroidism at this time.    Subjective      Diabetic Complications:  Eyes: No  Kidneys: No  Feet: No  Heart: No    Diet and Exercise:  Meals per day: 3  Minutes of exercise per week: 0 mins.    Review of Systems:   Review of Systems   Constitutional: Negative.    Cardiovascular: Negative.    Gastrointestinal: Negative.    Endocrine: Negative.        The following portions of the patient's history were reviewed and updated as appropriate: allergies, current medications, past family history, past medical history, past social history, past surgical history and problem list.    Objective       Visit Vitals  /58   Pulse 88   Ht 160 cm (63\")   Wt 75.8 kg (167 lb 3.2 oz)   LMP 2014 (Approximate)   SpO2 98%   BMI 29.62 kg/m²       Physical Exam:  Physical Exam  Constitutional:       Appearance: Normal appearance.   Neurological:      Mental Status: She is alert.         Labs:    HbA1c  Lab Results   Component Value Date    HGBA1C 8.4 2023       CMP  Lab Results   Component Value Date    GLUCOSE 89 2022    BUN 23 2022    CREATININE 1.12 (H) 2022    EGFRIFNONA 66 " 07/21/2021    BCR 20.5 09/30/2022    K 4.4 09/30/2022    CO2 26.0 09/30/2022    CALCIUM 10.4 09/30/2022    AST 15 09/30/2022    ALT 14 09/30/2022        Lipid Panel  Lab Results   Component Value Date    HDL 74 (H) 09/30/2022    LDL 76 09/30/2022    TRIG 103 09/30/2022        TSH  Lab Results   Component Value Date    TSH 0.519 09/30/2022        Hemoglobin A1C  Lab Results   Component Value Date    HGBA1C 8.4 02/06/2023        Microalbumin/Creatinine  Lab Results   Component Value Date    MALBCRERATIO  09/30/2022      Comment:      Unable to calculate    MICROALBUR <1.2 09/30/2022           Assessment / Plan      Assessment & Plan:  Diagnoses and all orders for this visit:    1. Uncontrolled type 2 diabetes mellitus with hyperglycemia (HCC) (Primary)  Assessment & Plan:  Diabetes is unchanged.  She has been taking 1mg dose of ozempic.  Hasn't been able to get 2mg dose.  Continue current treatment regimen.  But titrate up ozempic if available.  Diabetes will be reassessed in 3 months.    Orders:  -     POC Glucose, Blood  -     POC Glycosylated Hemoglobin (Hb A1C)    2. Type 2 diabetes mellitus with hypoglycemia without coma, with long-term current use of insulin (HCC)  Assessment & Plan:  She notes lower bedtime and fasting glucose readings.  No severe lows.  Will send Rx for FreeStyle Cheryl 2 to see if this is covered.      3. Benign hypertension  Assessment & Plan:  Hypertension is improving with treatment.  Continue current treatment regimen.  Blood pressure will be reassessed at the next regular appointment.      4. Hypercholesteremia  Assessment & Plan:  Continue statin.  Lipids okay last visit.      5. Hypothyroidism (acquired)  Assessment & Plan:  Continue T4.  TSH okay last visit.      6. Insulin long-term use (HCC)    Other orders  -     Continuous Blood Gluc Sensor (FreeStyle Cheryl 2 Sensor) misc; 1 each Every 14 (Fourteen) Days.  Dispense: 2 each; Refill: 5  -     Continuous Blood Gluc  (FreeStyle  Cheryl 2 [place]) device; 1 each Daily.  Dispense: 1 each; Refill: 0    Current Outpatient Medications   Medication Instructions   • amLODIPine (NORVASC) 10 MG tablet Take 1 tablet by mouth once daily   • aspirin 81 mg, Oral, Daily   • BD Pen Needle [place] 2nd Gen 32G X 4 MM misc Use 1 pen 4 times daily   • Continuous Blood Gluc  (FreeStyle Cheryl 2 [place]) device 1 each, Does not apply, Daily   • Continuous Blood Gluc Sensor (FreeStyle Cheryl 2 Sensor) misc 1 each, Does not apply, Every 14 Days   • HumaLOG KwikPen 26 Units, Subcutaneous, 3 Times Daily With Meals   • Lantus SoloStar 20-30 Units, Subcutaneous, Daily   • levothyroxine (SYNTHROID, LEVOTHROID) 88 MCG tablet Take 1 tablet by mouth once daily   • losartan (COZAAR) 100 mg, Oral, Daily   • Ozempic (2 MG/DOSE) 2 mg, Subcutaneous, Every 7 Days   • rosuvastatin (CRESTOR) 10 MG tablet Take 1 tablet by mouth once daily      Return in about 3 months (around 5/6/2023) for Recheck with A1c, TSH.    Julian Miranda MD   02/06/2023

## 2023-02-06 NOTE — ASSESSMENT & PLAN NOTE
Diabetes is unchanged.  She has been taking 1mg dose of ozempic.  Hasn't been able to get 2mg dose.  Continue current treatment regimen.  But titrate up ozempic if available.  Diabetes will be reassessed in 3 months.

## 2023-04-20 RX ORDER — INSULIN GLARGINE 100 [IU]/ML
INJECTION, SOLUTION SUBCUTANEOUS
Qty: 15 ML | Refills: 1 | Status: SHIPPED | OUTPATIENT
Start: 2023-04-20

## 2023-05-09 RX ORDER — INSULIN LISPRO 100 [IU]/ML
INJECTION, SOLUTION INTRAVENOUS; SUBCUTANEOUS
Qty: 75 ML | Refills: 0 | Status: SHIPPED | OUTPATIENT
Start: 2023-05-09

## 2023-05-09 NOTE — TELEPHONE ENCOUNTER
Refill Humalog KwikPen 100 unit/mL.  Refill BD [place] Pen Needle 2nd Gen 32 g x 4 mm.  Refill to [facility].  Dx Code E11.65.  Last office visit 02/06/23.  Next scheduled office visit 06/15/23

## 2023-06-15 ENCOUNTER — OFFICE VISIT (OUTPATIENT)
Dept: ENDOCRINOLOGY | Facility: CLINIC | Age: 63
End: 2023-06-15
Payer: COMMERCIAL

## 2023-06-15 VITALS
HEART RATE: 96 BPM | SYSTOLIC BLOOD PRESSURE: 158 MMHG | HEIGHT: 63 IN | BODY MASS INDEX: 28.88 KG/M2 | DIASTOLIC BLOOD PRESSURE: 78 MMHG | WEIGHT: 163 LBS

## 2023-06-15 DIAGNOSIS — E78.00 HYPERCHOLESTEREMIA: ICD-10-CM

## 2023-06-15 DIAGNOSIS — E11.649 TYPE 2 DIABETES MELLITUS WITH HYPOGLYCEMIA WITHOUT COMA, WITH LONG-TERM CURRENT USE OF INSULIN: ICD-10-CM

## 2023-06-15 DIAGNOSIS — E03.9 HYPOTHYROIDISM (ACQUIRED): ICD-10-CM

## 2023-06-15 DIAGNOSIS — I10 BENIGN HYPERTENSION: ICD-10-CM

## 2023-06-15 DIAGNOSIS — Z79.4 TYPE 2 DIABETES MELLITUS WITH HYPOGLYCEMIA WITHOUT COMA, WITH LONG-TERM CURRENT USE OF INSULIN: ICD-10-CM

## 2023-06-15 DIAGNOSIS — E11.65 UNCONTROLLED TYPE 2 DIABETES MELLITUS WITH HYPERGLYCEMIA: Primary | ICD-10-CM

## 2023-06-15 LAB
EXPIRATION DATE: ABNORMAL
EXPIRATION DATE: NORMAL
GLUCOSE BLDC GLUCOMTR-MCNC: 344 MG/DL (ref 70–130)
HBA1C MFR BLD: 8 %
Lab: ABNORMAL
Lab: NORMAL

## 2023-06-15 PROCEDURE — 84443 ASSAY THYROID STIM HORMONE: CPT | Performed by: INTERNAL MEDICINE

## 2023-06-15 NOTE — ASSESSMENT & PLAN NOTE
Will send Rx for FreeStyle Cheryl 2 again.  She notes some lows after supper.  Will decrease supper dose of insulin by 2u.

## 2023-06-15 NOTE — ASSESSMENT & PLAN NOTE
Diabetes is improving with treatment.  A1c better but still above goal. Fasting FSBS at goal.  Continue current treatment regimen.  We discussed increasing ozempic if she can get the higher dose from her pharmacy.  Diabetes will be reassessed in 3 months.

## 2023-06-15 NOTE — ASSESSMENT & PLAN NOTE
Hypertension is  fluctuating .  Continue current treatment regimen.  Monitor BP at home.  Blood pressure will be reassessed at the next regular appointment.

## 2023-06-15 NOTE — PROGRESS NOTES
"     Office Note      Date: 06/15/2023  Patient Name: Carmen Nava  MRN: [id number]  : 1960    Chief Complaint   Patient presents with    Diabetes     Type II       History of Present Illness:   Carmen Nava is a 62 y.o. female who presents for Diabetes type 2. Diagnosed in: . Treated in past with oral agents. Current treatments: ozempic and basal bolus insulin. Number of insulin shots per day: 4. Checks blood sugar 3 times a day. Has low blood sugar: frequent - at bedtime. Aspirin use: Yes. Statin use: Yes. ACE-I/ARB use: Yes. Changes in health since last visit: none. Last eye exam 2022.     She remains on T4 88mcg qd.  She is taking this correctly.  She isn't taking any interfering meds concurrently.  She hasn't noted any change in the size of her neck.  She denies any compressive sxs.  She denies any sxs of hypo- or hyperthyroidism at this time.    Subjective      Diabetic Complications:  Eyes: No  Kidneys: No  Feet: No  Heart: No    Diet and Exercise:  Meals per day: 3  Minutes of exercise per week: 0 mins.    Review of Systems:   Review of Systems   Constitutional: Negative.    Cardiovascular: Negative.    Gastrointestinal: Negative.    Endocrine: Negative.      The following portions of the patient's history were reviewed and updated as appropriate: allergies, current medications, past family history, past medical history, past social history, past surgical history, and problem list.    Objective       Visit Vitals  /78 (BP Location: Left arm, Patient Position: Sitting, Cuff Size: Adult)   Pulse 96   Ht 160 cm (63\")   Wt 73.9 kg (163 lb)   LMP 2014 (Approximate)   BMI 28.87 kg/m²       Physical Exam:  Physical Exam  Constitutional:       Appearance: Normal appearance.   Neurological:      Mental Status: She is alert.       Labs:    HbA1c  Lab Results   Component Value Date    HGBA1C 8.0 06/15/2023       CMP  Lab Results   Component Value Date    GLUCOSE 89 2022    BUN 23 " 09/30/2022    CREATININE 1.12 (H) 09/30/2022    EGFRIFNONA 66 07/21/2021    BCR 20.5 09/30/2022    K 4.4 09/30/2022    CO2 26.0 09/30/2022    CALCIUM 10.4 09/30/2022    AST 15 09/30/2022    ALT 14 09/30/2022        Lipid Panel  Lab Results   Component Value Date    HDL 74 (H) 09/30/2022    LDL 76 09/30/2022    TRIG 103 09/30/2022        TSH  Lab Results   Component Value Date    TSH 0.519 09/30/2022        Hemoglobin A1C  Lab Results   Component Value Date    HGBA1C 8.0 06/15/2023        Microalbumin/Creatinine  Lab Results   Component Value Date    MALBCRERATIO  09/30/2022      Comment:      Unable to calculate    MICROALBUR <1.2 09/30/2022           Assessment / Plan      Assessment & Plan:  Diagnoses and all orders for this visit:    1. Uncontrolled type 2 diabetes mellitus with hyperglycemia (Primary)  Assessment & Plan:  Diabetes is improving with treatment.  A1c better but still above goal. Fasting FSBS at goal.  Continue current treatment regimen.  We discussed increasing ozempic if she can get the higher dose from her pharmacy.  Diabetes will be reassessed in 3 months.    Orders:  -     POC Glucose, Blood  -     POC Glycosylated Hemoglobin (Hb A1C)    2. Type 2 diabetes mellitus with hypoglycemia without coma, with long-term current use of insulin  Assessment & Plan:  Will send Rx for FreeStyle Cheryl 2 again.  She notes some lows after supper.  Will decrease supper dose of insulin by 2u.      3. Benign hypertension  Assessment & Plan:  Hypertension is  fluctuating .  Continue current treatment regimen.  Monitor BP at home.  Blood pressure will be reassessed at the next regular appointment.      4. Hypercholesteremia  Assessment & Plan:  Continue statin.  Plan to check lipids next visit.      5. Hypothyroidism (acquired)  Assessment & Plan:  Continue T4 tx.  Check TSH.  Will send note about results.    Orders:  -     TSH; Future    Other orders  -     Continuous Blood Gluc  (FreeStyle Cheryl 2 [place])  device; 1 each Daily.  Dispense: 1 each; Refill: 0  -     Continuous Blood Gluc Sensor (FreeStyle Cheryl 2 Sensor) misc; 1 each Every 14 (Fourteen) Days.  Dispense: 2 each; Refill: 5      Current Outpatient Medications   Medication Instructions    amLODIPine (NORVASC) 10 MG tablet Take 1 tablet by mouth once daily    aspirin 81 mg, Oral, Daily    BD Pen Needle [place] 2nd Gen 32G X 4 MM misc Use 1 pen 4 times daily    Continuous Blood Gluc  (FreeStyle Cheryl 2 [place]) device 1 each, Does not apply, Daily    Continuous Blood Gluc Sensor (FreeStyle Cheryl 2 Sensor) misc 1 each, Does not apply, Every 14 Days    HumaLOG KwikPen 100 UNIT/ML solution pen-injector INJECT 26 UNITS SUBCUTANEOUSLY THREE TIMES DAILY WITH MEALS AS DIRECTED    Lantus SoloStar 100 UNIT/ML injection pen INJECT 20 TO 30 UNITS SUBCUTANEOUSLY ONCE DAILY    levothyroxine (SYNTHROID, LEVOTHROID) 88 MCG tablet Take 1 tablet by mouth once daily    losartan (COZAAR) 100 mg, Oral, Daily    Ozempic (2 MG/DOSE) 2 mg, Subcutaneous, Every 7 Days    rosuvastatin (CRESTOR) 10 MG tablet Take 1 tablet by mouth once daily      Return in about 3 months (around 9/15/2023) for Recheck with A1c.    Julian Miranda MD   06/15/2023

## 2023-06-16 LAB — TSH SERPL DL<=0.05 MIU/L-ACNC: 0.7 UIU/ML (ref 0.27–4.2)

## 2023-08-07 RX ORDER — PEN NEEDLE, DIABETIC 32GX 5/32"
NEEDLE, DISPOSABLE MISCELLANEOUS
Qty: 400 EACH | Refills: 3 | Status: SHIPPED | OUTPATIENT
Start: 2023-08-07

## 2023-08-07 RX ORDER — INSULIN LISPRO 100 [IU]/ML
INJECTION, SOLUTION INTRAVENOUS; SUBCUTANEOUS
Qty: 75 ML | Refills: 3 | Status: SHIPPED | OUTPATIENT
Start: 2023-08-07

## 2023-08-07 NOTE — TELEPHONE ENCOUNTER
Rx Refill Note  Requested Prescriptions     Pending Prescriptions Disp Refills    HumaLOG KwikPen 100 UNIT/ML solution pen-injector [Pharmacy Med Name: HumaLOG KwikPen 100 UNIT/ML Subcutaneous Solution Pen-injector] 75 mL 3     Sig: INJECT 26 UNITS SUBCUTANEOUSLY THREE TIMES DAILY WITH MEALS    BD Pen Needle [place] 2nd Gen 32G X 4 MM misc 400 each 3     Sig: Use 1 pen 4 times daily    Dx Code:  E11.65  Last office visit with prescribing clinician: 6/15/2023   Last telemedicine visit with prescribing clinician: Visit date not found   Next office visit with prescribing clinician: 11/16/2023                         Would you like a call back once the refill request has been completed: [] Yes [] No    If the office needs to give you a call back, can they leave a voicemail: [] Yes [] No    Cheryl Bartlett MA  08/07/23, 10:02 EDT

## 2023-08-18 RX ORDER — LEVOTHYROXINE SODIUM 88 UG/1
TABLET ORAL
Qty: 90 TABLET | Refills: 3 | Status: SHIPPED | OUTPATIENT
Start: 2023-08-18

## 2023-08-18 NOTE — TELEPHONE ENCOUNTER
Rx Refill Note  Requested Prescriptions     Pending Prescriptions Disp Refills    levothyroxine (SYNTHROID, LEVOTHROID) 88 MCG tablet [Pharmacy Med Name: Levothyroxine Sodium 88 MCG Oral Tablet] 90 tablet 0     Sig: Take 1 tablet by mouth once daily      Last office visit with prescribing clinician: 6/15/2023   Last telemedicine visit with prescribing clinician: Visit date not found   Next office visit with prescribing clinician: 11/16/2023                         Would you like a call back once the refill request has been completed: [] Yes [] No    If the office needs to give you a call back, can they leave a voicemail: [] Yes [] No    Cheryl Bartlett MA  08/18/23, 10:01 EDT

## 2023-09-29 RX ORDER — INSULIN GLARGINE 100 [IU]/ML
INJECTION, SOLUTION SUBCUTANEOUS
Qty: 15 ML | Refills: 0 | Status: SHIPPED | OUTPATIENT
Start: 2023-09-29

## 2023-09-29 NOTE — TELEPHONE ENCOUNTER
Rx Refill Note    Requested Prescriptions     Pending Prescriptions Disp Refills    Lantus SoloStar 100 UNIT/ML injection pen [Pharmacy Med Name: Lantus SoloStar 100 UNIT/ML Subcutaneous Solution Pen-injector] 15 mL 0     Sig: INJECT 20 TO 30 UNITS SUBCUTANEOUSLY ONCE DAILY        Last office visit with prescribing clinician: 6/15/23    Next office visit with prescribing clinician: 11/16/23  {

## 2023-11-16 ENCOUNTER — OFFICE VISIT (OUTPATIENT)
Dept: ENDOCRINOLOGY | Facility: CLINIC | Age: 63
End: 2023-11-16
Payer: COMMERCIAL

## 2023-11-16 VITALS
HEART RATE: 93 BPM | OXYGEN SATURATION: 97 % | WEIGHT: 189 LBS | BODY MASS INDEX: 33.49 KG/M2 | SYSTOLIC BLOOD PRESSURE: 158 MMHG | HEIGHT: 63 IN | DIASTOLIC BLOOD PRESSURE: 68 MMHG

## 2023-11-16 DIAGNOSIS — E11.649 TYPE 2 DIABETES MELLITUS WITH HYPOGLYCEMIA WITHOUT COMA, WITH LONG-TERM CURRENT USE OF INSULIN: ICD-10-CM

## 2023-11-16 DIAGNOSIS — I10 BENIGN HYPERTENSION: ICD-10-CM

## 2023-11-16 DIAGNOSIS — E11.65 UNCONTROLLED TYPE 2 DIABETES MELLITUS WITH HYPERGLYCEMIA: Primary | ICD-10-CM

## 2023-11-16 DIAGNOSIS — E78.00 HYPERCHOLESTEREMIA: ICD-10-CM

## 2023-11-16 DIAGNOSIS — Z79.4 TYPE 2 DIABETES MELLITUS WITH HYPOGLYCEMIA WITHOUT COMA, WITH LONG-TERM CURRENT USE OF INSULIN: ICD-10-CM

## 2023-11-16 DIAGNOSIS — E03.9 HYPOTHYROIDISM (ACQUIRED): ICD-10-CM

## 2023-11-16 LAB
ALBUMIN SERPL-MCNC: 4.2 G/DL (ref 3.5–5.2)
ALBUMIN UR-MCNC: <1.2 MG/DL
ALBUMIN/GLOB SERPL: 1.4 G/DL
ALP SERPL-CCNC: 131 U/L (ref 39–117)
ALT SERPL W P-5'-P-CCNC: 16 U/L (ref 1–33)
ANION GAP SERPL CALCULATED.3IONS-SCNC: 7 MMOL/L (ref 5–15)
AST SERPL-CCNC: 18 U/L (ref 1–32)
BILIRUB SERPL-MCNC: 0.7 MG/DL (ref 0–1.2)
BUN SERPL-MCNC: 16 MG/DL (ref 8–23)
BUN/CREAT SERPL: 19 (ref 7–25)
CALCIUM SPEC-SCNC: 10 MG/DL (ref 8.6–10.5)
CHLORIDE SERPL-SCNC: 105 MMOL/L (ref 98–107)
CHOLEST SERPL-MCNC: 162 MG/DL (ref 0–200)
CO2 SERPL-SCNC: 26 MMOL/L (ref 22–29)
CREAT SERPL-MCNC: 0.84 MG/DL (ref 0.57–1)
CREAT UR-MCNC: 89.7 MG/DL
EGFRCR SERPLBLD CKD-EPI 2021: 77.7 ML/MIN/1.73
EXPIRATION DATE: ABNORMAL
EXPIRATION DATE: ABNORMAL
GLOBULIN UR ELPH-MCNC: 2.9 GM/DL
GLUCOSE BLDC GLUCOMTR-MCNC: 274 MG/DL (ref 70–130)
GLUCOSE SERPL-MCNC: 299 MG/DL (ref 65–99)
HBA1C MFR BLD: 7.4 % (ref 4.5–5.7)
HDLC SERPL-MCNC: 72 MG/DL (ref 40–60)
LDLC SERPL CALC-MCNC: 73 MG/DL (ref 0–100)
LDLC/HDLC SERPL: 0.98 {RATIO}
Lab: ABNORMAL
Lab: ABNORMAL
MICROALBUMIN/CREAT UR: NORMAL MG/G{CREAT}
POTASSIUM SERPL-SCNC: 4.8 MMOL/L (ref 3.5–5.2)
PROT SERPL-MCNC: 7.1 G/DL (ref 6–8.5)
SODIUM SERPL-SCNC: 138 MMOL/L (ref 136–145)
TRIGL SERPL-MCNC: 96 MG/DL (ref 0–150)
TSH SERPL DL<=0.05 MIU/L-ACNC: 4.78 UIU/ML (ref 0.27–4.2)
VLDLC SERPL-MCNC: 17 MG/DL (ref 5–40)

## 2023-11-16 PROCEDURE — 80061 LIPID PANEL: CPT | Performed by: INTERNAL MEDICINE

## 2023-11-16 PROCEDURE — 84443 ASSAY THYROID STIM HORMONE: CPT | Performed by: INTERNAL MEDICINE

## 2023-11-16 PROCEDURE — 83036 HEMOGLOBIN GLYCOSYLATED A1C: CPT | Performed by: INTERNAL MEDICINE

## 2023-11-16 PROCEDURE — 95251 CONT GLUC MNTR ANALYSIS I&R: CPT | Performed by: INTERNAL MEDICINE

## 2023-11-16 PROCEDURE — 80053 COMPREHEN METABOLIC PANEL: CPT | Performed by: INTERNAL MEDICINE

## 2023-11-16 PROCEDURE — 99214 OFFICE O/P EST MOD 30 MIN: CPT | Performed by: INTERNAL MEDICINE

## 2023-11-16 PROCEDURE — 82043 UR ALBUMIN QUANTITATIVE: CPT | Performed by: INTERNAL MEDICINE

## 2023-11-16 PROCEDURE — 82570 ASSAY OF URINE CREATININE: CPT | Performed by: INTERNAL MEDICINE

## 2023-11-16 NOTE — PROGRESS NOTES
"     Office Note      Date: 2023  Patient Name: Carmen Nava  MRN: [id number]  : 1960    Chief Complaint   Patient presents with    Diabetes     Type II    Hypothyroidism       History of Present Illness:   Carmen Nava is a 62 y.o. female who presents for Diabetes type 2. Diagnosed in: . Treated in past with oral agents. Current treatments: ozempic and basal bolus insulin. Number of insulin shots per day: 4. Checks blood sugar 3 times a day. Has low blood sugar: frequent - at bedtime. Aspirin use: Yes. Statin use: Yes. ACE-I/ARB use: Yes. Changes in health since last visit: none. Last eye exam 2022.     She remains on T4 88mcg qd.  She is taking this correctly.  She isn't taking any interfering meds concurrently.  She hasn't noted any change in the size of her neck.  She denies any compressive sxs.  She denies any sxs of hypo- or hyperthyroidism at this time.    Subjective      Diabetic Complications:  Eyes: No  Kidneys: No  Feet: No  Heart: No    Diet and Exercise:  Meals per day: 3  Minutes of exercise per week: 0 mins.    Review of Systems:   Review of Systems   Constitutional: Negative.    Cardiovascular: Negative.    Gastrointestinal: Negative.    Endocrine: Negative.        The following portions of the patient's history were reviewed and updated as appropriate: allergies, current medications, past family history, past medical history, past social history, past surgical history, and problem list.    Objective     Visit Vitals  /68 (BP Location: Left arm, Patient Position: Sitting, Cuff Size: Adult)   Pulse 93   Ht 160 cm (63\")   Wt 85.7 kg (189 lb)   LMP 2014 (Approximate)   SpO2 97%   BMI 33.48 kg/m²       Physical Exam:  Physical Exam  Constitutional:       Appearance: Normal appearance.   Cardiovascular:      Pulses:           Dorsalis pedis pulses are 2+ on the right side and 2+ on the left side.        Posterior tibial pulses are 2+ on the right side and 2+ on the left " side.   Feet:      Right foot:      Protective Sensation: 5 sites tested.  5 sites sensed.      Skin integrity: Skin integrity normal.      Toenail Condition: Right toenails are normal.      Left foot:      Protective Sensation: 5 sites tested.  5 sites sensed.      Skin integrity: Skin integrity normal.      Toenail Condition: Left toenails are normal.   Neurological:      Mental Status: She is alert.         Labs:    HbA1c  Lab Results   Component Value Date    HGBA1C 7.4 (A) 11/16/2023       CMP  Lab Results   Component Value Date    GLUCOSE 89 09/30/2022    BUN 23 09/30/2022    CREATININE 1.12 (H) 09/30/2022    EGFRIFNONA 66 07/21/2021    BCR 20.5 09/30/2022    K 4.4 09/30/2022    CO2 26.0 09/30/2022    CALCIUM 10.4 09/30/2022    AST 15 09/30/2022    ALT 14 09/30/2022        Lipid Panel  Lab Results   Component Value Date    HDL 74 (H) 09/30/2022    LDL 76 09/30/2022    TRIG 103 09/30/2022        TSH  Lab Results   Component Value Date    TSH 0.703 06/15/2023        Hemoglobin A1C  Lab Results   Component Value Date    HGBA1C 7.4 (A) 11/16/2023        Microalbumin/Creatinine  Lab Results   Component Value Date    MALBCRERATIO  09/30/2022      Comment:      Unable to calculate    MICROALBUR <1.2 09/30/2022           Assessment / Plan      Assessment & Plan:  Diagnoses and all orders for this visit:    1. Uncontrolled type 2 diabetes mellitus with hyperglycemia (Primary)  Assessment & Plan:  Diabetes is improving with treatment.  She has been out of ozempic due to shortage.  Continue current treatment regimen.  But adjust meds as below.  Diabetes will be reassessed in 3 months.    FreeStyle Cheryl 2 CGM was downloaded today.  Data was reviewed from 11/3/23 to 11/16/23.  This showed some nocturnal hypoglycemia but postprandial spikes.  Will decrease basal insulin but resume ozempic.    Orders:  -     POC Glucose, Blood  -     POC Glycosylated Hemoglobin (Hb A1C)  -     Comprehensive Metabolic Panel; Future  -      Lipid Panel; Future  -     Microalbumin / Creatinine Urine Ratio - Urine, Clean Catch; Future  -     TSH; Future    2. Benign hypertension  Assessment & Plan:  Hypertension is unchanged.  SBP a bit elevated today but DBP okay.  She reports BP at home is okay.  Continue current treatment regimen.  Monitor BP at home.  Blood pressure will be reassessed at the next regular appointment.      3. Hypercholesteremia  Assessment & Plan:  Continue statin.  Check lipids today.      4. Hypothyroidism (acquired)  Assessment & Plan:  Continue T4.  Check TSH today.      5. Type 2 diabetes mellitus with hypoglycemia without coma, with long-term current use of insulin  Assessment & Plan:  Continue CGM.  Having some nocturnal lows.  Decrease lantus.        Current Outpatient Medications   Medication Instructions    amLODIPine (NORVASC) 10 MG tablet Take 1 tablet by mouth once daily    aspirin 81 mg, Oral, Daily    BD Pen Needle [place] 2nd Gen 32G X 4 MM misc Use 1 pen 4 times daily    Continuous Blood Gluc  (FreeStyle Cheryl 2 [place]) device 1 each, Does not apply, Daily    Continuous Blood Gluc Sensor (FreeStyle Cheryl 2 Sensor) misc 1 each, Does not apply, Every 14 Days    HumaLOG KwikPen 100 UNIT/ML solution pen-injector INJECT 26 UNITS SUBCUTANEOUSLY THREE TIMES DAILY WITH MEALS    Lantus SoloStar 100 UNIT/ML injection pen INJECT 20 TO 30 UNITS SUBCUTANEOUSLY ONCE DAILY    levothyroxine (SYNTHROID, LEVOTHROID) 88 MCG tablet Take 1 tablet by mouth once daily    losartan (COZAAR) 100 mg, Oral, Daily    rosuvastatin (CRESTOR) 10 MG tablet Take 1 tablet by mouth once daily      Return in about 3 months (around 2/16/2024) for Recheck with A1c.    Julian Miranda MD   11/16/2023

## 2023-11-16 NOTE — ASSESSMENT & PLAN NOTE
Diabetes is improving with treatment.  She has been out of ozempic due to shortage.  Continue current treatment regimen.  But adjust meds as below.  Diabetes will be reassessed in 3 months.    FreeStyle Cheryl 2 CGM was downloaded today.  Data was reviewed from 11/3/23 to 11/16/23.  This showed some nocturnal hypoglycemia but postprandial spikes.  Will decrease basal insulin but resume ozempic.

## 2023-11-16 NOTE — ASSESSMENT & PLAN NOTE
Hypertension is unchanged.  SBP a bit elevated today but DBP okay.  She reports BP at home is okay.  Continue current treatment regimen.  Monitor BP at home.  Blood pressure will be reassessed at the next regular appointment.

## 2023-12-21 RX ORDER — ROSUVASTATIN CALCIUM 10 MG/1
TABLET, COATED ORAL
Qty: 90 TABLET | Refills: 3 | Status: SHIPPED | OUTPATIENT
Start: 2023-12-21

## 2023-12-21 RX ORDER — INSULIN GLARGINE 100 [IU]/ML
INJECTION, SOLUTION SUBCUTANEOUS
Qty: 15 ML | Refills: 5 | Status: SHIPPED | OUTPATIENT
Start: 2023-12-21

## 2023-12-21 NOTE — TELEPHONE ENCOUNTER
Rx Refill Note  Requested Prescriptions     Pending Prescriptions Disp Refills    Lantus SoloStar 100 UNIT/ML injection pen [Pharmacy Med Name: Lantus SoloStar 100 UNIT/ML Subcutaneous Solution Pen-injector] 15 mL 0     Sig: INJECT 20 TO 30 UNITS SUBCUTANEOUSLY ONCE DAILY    rosuvastatin (CRESTOR) 10 MG tablet [Pharmacy Med Name: Rosuvastatin Calcium 10 MG Oral Tablet] 90 tablet 0     Sig: Take 1 tablet by mouth once daily      Last office visit with prescribing clinician: 11/16/2023     Next office visit with prescribing clinician: 4/17/2024       Marla Zambrano MA  12/21/23, 13:04 EST

## 2023-12-26 ENCOUNTER — TELEPHONE (OUTPATIENT)
Dept: ENDOCRINOLOGY | Facility: CLINIC | Age: 63
End: 2023-12-26

## 2023-12-26 NOTE — TELEPHONE ENCOUNTER
Provider: AKIN DONALDSON     Caller: ADELAIDE ORTEGA    Relationship to Patient: SELF    Phone Number: [phone]     Reason for Call: THE PT NEEDS A NEW PRESCRIPTION FOR HER FREESTYLE JEAN-PAUL SENSORS, HER INSURANCE HAS DENIED THE REQUEST AND SHE WOULD LIKE TO KNOW WHAT SHE SHOULD DO.

## 2023-12-28 ENCOUNTER — TELEPHONE (OUTPATIENT)
Dept: ENDOCRINOLOGY | Facility: CLINIC | Age: 63
End: 2023-12-28
Payer: MEDICARE

## 2023-12-28 RX ORDER — SEMAGLUTIDE 1.34 MG/ML
1 INJECTION, SOLUTION SUBCUTANEOUS WEEKLY
Qty: 3 ML | Refills: 3 | Status: SHIPPED | OUTPATIENT
Start: 2023-12-28

## 2023-12-28 NOTE — TELEPHONE ENCOUNTER
Caller: Carmen Nava    Relationship to patient: Self    Best call back number:[phone]     Patient is needing: PT IS NEEDING A PRESCRIPTION SENT IN FOR OZEMPIC. PLEASE SEND TO   [facility] [address] [place] [address] - [address] [facility] [phone]  - [phone]  [address] [facility] [address] Phone: [phone] Fax: [phone] Hours: Not open 24 hours     PT WAS USING SAMPLES OF THE OZEMPIC. PT IS OUT OF THE SAMPLE PLEASE ADVISE.

## 2023-12-28 NOTE — TELEPHONE ENCOUNTER
Spoke to pt-she said you gave her a sample of ozempic and then was going to send in 1mg dose after she finish the sample.   Pended ozempic 1mg

## 2024-02-01 ENCOUNTER — TELEPHONE (OUTPATIENT)
Dept: ENDOCRINOLOGY | Facility: CLINIC | Age: 64
End: 2024-02-01
Payer: MEDICARE

## 2024-02-01 NOTE — TELEPHONE ENCOUNTER
RE: PRIOR AUTH FOR OZEMPIC    CARELON CALLED NEEDING MORE INFORMATION FOR PRIOR AUTH ON OZEMPIC.    CALL BACK [phone]     REF # [id number]    72 HOUR TURNAROUND TIME

## 2024-02-23 RX ORDER — SEMAGLUTIDE 1.34 MG/ML
1 INJECTION, SOLUTION SUBCUTANEOUS WEEKLY
Qty: 9 ML | Refills: 0 | Status: SHIPPED | OUTPATIENT
Start: 2024-02-23

## 2024-02-23 NOTE — TELEPHONE ENCOUNTER
Rx Refill Note  Requested Prescriptions      No prescriptions requested or ordered in this encounter      Last office visit with prescribing clinician: 11/16/2023     Next office visit with prescribing clinician: 4/17/2024

## 2024-03-20 ENCOUNTER — TRANSCRIBE ORDERS (OUTPATIENT)
Dept: ADMINISTRATIVE | Facility: HOSPITAL | Age: 64
End: 2024-03-20
Payer: MEDICARE

## 2024-03-20 DIAGNOSIS — Z12.31 VISIT FOR SCREENING MAMMOGRAM: Primary | ICD-10-CM

## 2024-04-17 ENCOUNTER — OFFICE VISIT (OUTPATIENT)
Dept: ENDOCRINOLOGY | Facility: CLINIC | Age: 64
End: 2024-04-17
Payer: MEDICARE

## 2024-04-17 VITALS
DIASTOLIC BLOOD PRESSURE: 60 MMHG | SYSTOLIC BLOOD PRESSURE: 144 MMHG | OXYGEN SATURATION: 97 % | HEART RATE: 94 BPM | WEIGHT: 184 LBS | BODY MASS INDEX: 32.59 KG/M2

## 2024-04-17 DIAGNOSIS — Z79.4 TYPE 2 DIABETES MELLITUS WITH HYPOGLYCEMIA WITHOUT COMA, WITH LONG-TERM CURRENT USE OF INSULIN: ICD-10-CM

## 2024-04-17 DIAGNOSIS — E78.00 HYPERCHOLESTEREMIA: ICD-10-CM

## 2024-04-17 DIAGNOSIS — E03.9 HYPOTHYROIDISM (ACQUIRED): ICD-10-CM

## 2024-04-17 DIAGNOSIS — E11.65 UNCONTROLLED TYPE 2 DIABETES MELLITUS WITH HYPERGLYCEMIA: Primary | ICD-10-CM

## 2024-04-17 DIAGNOSIS — E11.649 TYPE 2 DIABETES MELLITUS WITH HYPOGLYCEMIA WITHOUT COMA, WITH LONG-TERM CURRENT USE OF INSULIN: ICD-10-CM

## 2024-04-17 DIAGNOSIS — I10 BENIGN HYPERTENSION: ICD-10-CM

## 2024-04-17 LAB
EXPIRATION DATE: ABNORMAL
EXPIRATION DATE: ABNORMAL
GLUCOSE BLDC GLUCOMTR-MCNC: 369 MG/DL (ref 70–130)
HBA1C MFR BLD: 7.1 % (ref 4.5–5.7)
Lab: ABNORMAL
Lab: ABNORMAL

## 2024-04-17 PROCEDURE — 1159F MED LIST DOCD IN RCRD: CPT | Performed by: INTERNAL MEDICINE

## 2024-04-17 PROCEDURE — 83036 HEMOGLOBIN GLYCOSYLATED A1C: CPT | Performed by: INTERNAL MEDICINE

## 2024-04-17 PROCEDURE — 3077F SYST BP >= 140 MM HG: CPT | Performed by: INTERNAL MEDICINE

## 2024-04-17 PROCEDURE — 1160F RVW MEDS BY RX/DR IN RCRD: CPT | Performed by: INTERNAL MEDICINE

## 2024-04-17 PROCEDURE — 99214 OFFICE O/P EST MOD 30 MIN: CPT | Performed by: INTERNAL MEDICINE

## 2024-04-17 PROCEDURE — 84443 ASSAY THYROID STIM HORMONE: CPT | Performed by: INTERNAL MEDICINE

## 2024-04-17 PROCEDURE — 95251 CONT GLUC MNTR ANALYSIS I&R: CPT | Performed by: INTERNAL MEDICINE

## 2024-04-17 PROCEDURE — 3051F HG A1C>EQUAL 7.0%<8.0%: CPT | Performed by: INTERNAL MEDICINE

## 2024-04-17 PROCEDURE — 3078F DIAST BP <80 MM HG: CPT | Performed by: INTERNAL MEDICINE

## 2024-04-17 PROCEDURE — 36415 COLL VENOUS BLD VENIPUNCTURE: CPT | Performed by: INTERNAL MEDICINE

## 2024-04-17 NOTE — PROGRESS NOTES
Office Note      Date: 2024  Patient Name: Carmen Nava  MRN: [id number]  : 1960    Chief Complaint   Patient presents with    Uncontrolled type 2 diabetes mellitus with hyperglycemia       History of Present Illness:   Carmen Nava is a 63 y.o. female who presents for Diabetes type 2. Diagnosed in: . Treated in past with oral agents. Current treatments: ozempic and basal bolus insulin. Number of insulin shots per day: 4. Checks blood sugar 288 times a day. Has low blood sugar: occ. Aspirin use: Yes. Statin use: Yes. ACE-I/ARB use: Yes. Changes in health since last visit: none. Last eye exam 2022.     She remains on T4 88mcg qd.  She is taking this correctly.  She isn't taking any interfering meds concurrently.  She hasn't noted any change in the size of her neck.  She denies any compressive sxs.  She denies any sxs of hypo- or hyperthyroidism at this time.    Subjective      Diabetic Complications:  Eyes: No  Kidneys: No  Feet: No  Heart: No    Diet and Exercise:  Meals per day: 3  Minutes of exercise per week: 0 mins.    Review of Systems:   Review of Systems   Constitutional: Negative.    Cardiovascular: Negative.    Gastrointestinal: Negative.    Endocrine: Negative.        The following portions of the patient's history were reviewed and updated as appropriate: allergies, current medications, past family history, past medical history, past social history, past surgical history, and problem list.    Objective     Visit Vitals  /60 (BP Location: Right arm, Patient Position: Sitting)   Pulse 94   Wt 83.5 kg (184 lb)   LMP 2014 (Approximate)   SpO2 97%   BMI 32.59 kg/m²       Physical Exam:  Physical Exam  Constitutional:       Appearance: Normal appearance.   Neurological:      Mental Status: She is alert.         Labs:    HbA1c  Lab Results   Component Value Date    HGBA1C 7.1 (A) 2024       CMP  Lab Results   Component Value Date    GLUCOSE 299 (H) 2023    BUN  16 11/16/2023    CREATININE 0.84 11/16/2023    EGFRIFNONA 66 07/21/2021    BCR 19.0 11/16/2023    K 4.8 11/16/2023    CO2 26.0 11/16/2023    CALCIUM 10.0 11/16/2023    AST 18 11/16/2023    ALT 16 11/16/2023        Lipid Panel  Lab Results   Component Value Date    HDL 72 (H) 11/16/2023    LDL 73 11/16/2023    TRIG 96 11/16/2023        TSH  Lab Results   Component Value Date    TSH 4.780 (H) 11/16/2023        Hemoglobin A1C  Lab Results   Component Value Date    HGBA1C 7.1 (A) 04/17/2024        Microalbumin/Creatinine  Lab Results   Component Value Date    MALBCRERATIO  11/16/2023      Comment:      Unable to calculate    MICROALBUR <1.2 11/16/2023           Assessment / Plan      Assessment & Plan:  Diagnoses and all orders for this visit:    1. Uncontrolled type 2 diabetes mellitus with hyperglycemia (Primary)  Assessment & Plan:  Diabetes is improving with treatment.   Adjust insulin doses.  Diabetes will be reassessed in 3 months.    Contiue FreeStyle Cheryl 2.  We discussed upgrade to Cheryl 3 but she wanted to stay on 2 for now.  FreeStyle CGM was downloaded today.  Data was reviewed from 4/4/24 to 4/17/24.  This showed some nocturnal hypoglycemia.  Encouraged her to decrease lantus by 2u.  Also having significant postprandial spikes.  Encouraged her to increase humalog by 2u at each meal.    Orders:  -     POC Glucose, Blood  -     POC Glycosylated Hemoglobin (Hb A1C)    2. Benign hypertension  Assessment & Plan:  BP improving.  Continue current meds.  Monitor BP at home.      3. Hypercholesteremia  Assessment & Plan:  Continue statin.  Lipids okay last visit.      4. Hypothyroidism (acquired)  Assessment & Plan:  Continue T4 tx.  Check TSH today.    Orders:  -     TSH; Future    5. Type 2 diabetes mellitus with hypoglycemia without coma, with long-term current use of insulin  Assessment & Plan:  Continue CGM.        Current Outpatient Medications   Medication Instructions    amLODIPine (NORVASC) 10 MG tablet  Take 1 tablet by mouth once daily    aspirin 81 mg, Oral, Daily    BD Pen Needle [place] 2nd Gen 32G X 4 MM misc Use 1 pen 4 times daily    Continuous Blood Gluc  (FreeStyle Cheryl 2 [place]) device 1 each, Does not apply, Daily    Continuous Blood Gluc Sensor (FreeStyle Cheryl 2 Sensor) misc 1 each, Does not apply, Every 14 Days    HumaLOG KwikPen 100 UNIT/ML solution pen-injector INJECT 26 UNITS SUBCUTANEOUSLY THREE TIMES DAILY WITH MEALS    Lantus SoloStar 100 UNIT/ML injection pen INJECT 20 TO 30 UNITS SUBCUTANEOUSLY ONCE DAILY    levothyroxine (SYNTHROID, LEVOTHROID) 88 MCG tablet Take 1 tablet by mouth once daily    losartan (COZAAR) 100 mg, Oral, Daily    Ozempic (1 MG/DOSE) 1 mg, Subcutaneous, Weekly    rosuvastatin (CRESTOR) 10 MG tablet Take 1 tablet by mouth once daily      Return in about 3 months (around 7/17/2024) for Recheck with A1c, TSH.    Electronically signed by: Julian Miranda MD  04/17/2024

## 2024-04-17 NOTE — ASSESSMENT & PLAN NOTE
Diabetes is improving with treatment.   Adjust insulin doses.  Diabetes will be reassessed in 3 months.    [employer]issa[employer] FreeStyle Cheryl 2.  We discussed upgrade to Cheryl 3 but she wanted to stay on 2 for now.  FreeStyle CGM was downloaded today.  Data was reviewed from 4/4/24 to 4/17/24.  This showed some nocturnal hypoglycemia.  Encouraged her to decrease lantus by 2u.  Also having significant postprandial spikes.  Encouraged her to increase humalog by 2u at each meal.

## 2024-04-18 LAB — TSH SERPL DL<=0.05 MIU/L-ACNC: 1.13 UIU/ML (ref 0.27–4.2)

## 2024-04-25 ENCOUNTER — HOSPITAL ENCOUNTER (OUTPATIENT)
Dept: MAMMOGRAPHY | Facility: HOSPITAL | Age: 64
Discharge: HOME | End: 2024-04-25
Admitting: NURSE PRACTITIONER
Payer: MEDICARE

## 2024-04-25 DIAGNOSIS — Z12.31 VISIT FOR SCREENING MAMMOGRAM: ICD-10-CM

## 2024-04-25 PROCEDURE — 77063 BREAST TOMOSYNTHESIS BI: CPT

## 2024-04-25 PROCEDURE — 77067 SCR MAMMO BI INCL CAD: CPT

## 2024-05-24 ENCOUNTER — TELEPHONE (OUTPATIENT)
Dept: ENDOCRINOLOGY | Facility: CLINIC | Age: 64
End: 2024-05-24
Payer: MEDICARE

## 2024-05-24 NOTE — TELEPHONE ENCOUNTER
Called and spoke with Daniel. They state patient requested a tier exception.   It was denied due to there not being another brand medication in a lower tier on patients insurance.

## 2024-05-24 NOTE — TELEPHONE ENCOUNTER
[facility]  [phone] case # [id number]    They have clinical questions regarding the pts ozempic

## 2024-08-12 RX ORDER — LEVOTHYROXINE SODIUM 88 UG/1
TABLET ORAL
Qty: 90 TABLET | Refills: 1 | Status: SHIPPED | OUTPATIENT
Start: 2024-08-12

## 2024-10-21 RX ORDER — PEN NEEDLE, DIABETIC 32GX 5/32"
NEEDLE, DISPOSABLE MISCELLANEOUS
Qty: 400 EACH | Refills: 3 | Status: SHIPPED | OUTPATIENT
Start: 2024-10-21

## 2024-10-21 RX ORDER — INSULIN LISPRO 100 [IU]/ML
INJECTION, SOLUTION INTRAVENOUS; SUBCUTANEOUS
Qty: 75 ML | Refills: 3 | Status: SHIPPED | OUTPATIENT
Start: 2024-10-21

## 2024-10-21 NOTE — TELEPHONE ENCOUNTER
Rx Refill Note  Requested Prescriptions     Pending Prescriptions Disp Refills    HumaLOG KwikPen 100 UNIT/ML solution pen-injector [Pharmacy Med Name: HumaLOG KwikPen 100 UNIT/ML Subcutaneous Solution Pen-injector] 60 mL 0     Sig: INJECT 26 UNITS SUBCUTANEOUSLY THREE TIMES DAILY WITH MEALS    BD Pen Needle Aviva 2nd Gen 32G X 4 MM misc [Pharmacy Med Name: BD PEN NEEDLE/AVIVA [id number] MIS] 400 each 0     Sig: USE PEN NEEDLES 4 TIMES DAILY      Last office visit with prescribing clinician: 04/17/2024      Next office visit with prescribing clinician: 11/01/2024                  Dani Rivera MA  10/21/24, 14:23 EDT

## 2024-11-01 ENCOUNTER — OFFICE VISIT (OUTPATIENT)
Dept: ENDOCRINOLOGY | Facility: CLINIC | Age: 64
End: 2024-11-01
Payer: MEDICARE

## 2024-11-01 VITALS
BODY MASS INDEX: 35.44 KG/M2 | HEIGHT: 63 IN | DIASTOLIC BLOOD PRESSURE: 84 MMHG | SYSTOLIC BLOOD PRESSURE: 142 MMHG | WEIGHT: 200 LBS | OXYGEN SATURATION: 97 % | HEART RATE: 89 BPM

## 2024-11-01 DIAGNOSIS — Z79.4 TYPE 2 DIABETES MELLITUS WITH HYPOGLYCEMIA WITHOUT COMA, WITH LONG-TERM CURRENT USE OF INSULIN: ICD-10-CM

## 2024-11-01 DIAGNOSIS — I10 BENIGN HYPERTENSION: ICD-10-CM

## 2024-11-01 DIAGNOSIS — E11.649 TYPE 2 DIABETES MELLITUS WITH HYPOGLYCEMIA WITHOUT COMA, WITH LONG-TERM CURRENT USE OF INSULIN: ICD-10-CM

## 2024-11-01 DIAGNOSIS — E11.65 UNCONTROLLED TYPE 2 DIABETES MELLITUS WITH HYPERGLYCEMIA: Primary | ICD-10-CM

## 2024-11-01 DIAGNOSIS — E78.00 HYPERCHOLESTEREMIA: ICD-10-CM

## 2024-11-01 DIAGNOSIS — E03.9 HYPOTHYROIDISM (ACQUIRED): ICD-10-CM

## 2024-11-01 LAB
ALBUMIN UR-MCNC: <1.2 MG/DL
CREAT UR-MCNC: 77.9 MG/DL
EXPIRATION DATE: ABNORMAL
EXPIRATION DATE: ABNORMAL
GLUCOSE BLDC GLUCOMTR-MCNC: 209 MG/DL (ref 70–130)
HBA1C MFR BLD: 7.3 % (ref 4.5–5.7)
Lab: ABNORMAL
Lab: ABNORMAL
MICROALBUMIN/CREAT UR: NORMAL MG/G{CREAT}

## 2024-11-01 PROCEDURE — 84443 ASSAY THYROID STIM HORMONE: CPT | Performed by: INTERNAL MEDICINE

## 2024-11-01 PROCEDURE — 82043 UR ALBUMIN QUANTITATIVE: CPT | Performed by: INTERNAL MEDICINE

## 2024-11-01 PROCEDURE — 82570 ASSAY OF URINE CREATININE: CPT | Performed by: INTERNAL MEDICINE

## 2024-11-01 NOTE — PROGRESS NOTES
"     Office Note      Date: 2024  Patient Name: Carmen Nava  MRN: [id number]  : 1960    Chief Complaint   Patient presents with    Diabetes     Type II       History of Present Illness:   Carmen Nava is a 63 y.o. female who presents for Diabetes type 2. Diagnosed in: . Treated in past with oral agents. Current treatments: basal bolus insulin. Number of insulin shots per day: 4. Checks blood sugar 288 times a day - on FreeStyle Cheryl CGM. Has low blood sugar: occ. Aspirin use: Yes. Statin use: Yes. ACE-I/ARB use: Yes. Changes in health since last visit: none. Last eye exam 2022.     She remains on T4 88mcg qd.  She is taking this correctly.  She isn't taking any interfering meds concurrently.  She hasn't noted any change in the size of her neck.  She denies any compressive sxs.  She denies any sxs of hypo- or hyperthyroidism at this time.    Subjective      Diabetic Complications:  Eyes: No  Kidneys: No  Feet: No  Heart: No    Diet and Exercise:  Meals per day: 3  Minutes of exercise per week: 0 mins.    Review of Systems:   Review of Systems   Constitutional: Negative.    Cardiovascular: Negative.    Gastrointestinal: Negative.    Endocrine: Negative.        The following portions of the patient's history were reviewed and updated as appropriate: allergies, current medications, past family history, past medical history, past social history, past surgical history, and problem list.    Objective     Visit Vitals  /84 (BP Location: Right arm, Patient Position: Sitting)   Pulse 89   Ht 160 cm (63\")   Wt 90.7 kg (200 lb)   LMP 2014 (Approximate)   SpO2 97%   BMI 35.43 kg/m²       Physical Exam:  Physical Exam  Constitutional:       Appearance: Normal appearance.   Neurological:      Mental Status: She is alert.         Labs:    HbA1c  Lab Results   Component Value Date    HGBA1C 7.3 (A) 2024       CMP  Lab Results   Component Value Date    GLUCOSE 299 (H) 2023    BUN 16 " 11/16/2023    CREATININE 0.84 11/16/2023    EGFRIFNONA 66 07/21/2021    BCR 19.0 11/16/2023    K 4.8 11/16/2023    CO2 26.0 11/16/2023    CALCIUM 10.0 11/16/2023    AST 18 11/16/2023    ALT 16 11/16/2023        Lipid Panel  Lab Results   Component Value Date    HDL 72 (H) 11/16/2023    LDL 73 11/16/2023    TRIG 96 11/16/2023        TSH  Lab Results   Component Value Date    TSH 1.130 04/17/2024        Hemoglobin A1C  Lab Results   Component Value Date    HGBA1C 7.3 (A) 11/01/2024        Microalbumin/Creatinine  Lab Results   Component Value Date    MALBCRERATIO  11/16/2023      Comment:      Unable to calculate    MICROALBUR <1.2 11/16/2023           Assessment / Plan      Assessment & Plan:  Diagnoses and all orders for this visit:    1. Uncontrolled type 2 diabetes mellitus with hyperglycemia (Primary)  Assessment & Plan:  Diabetes is stable.  A1c not too bad.  We are limited by hypoglycemia.  Continue current treatment regimen.  Diabetes will be reassessed in 6 months.    She has come off the ozempic.  It was too expensive even with her insurance.    Encouraged eye exam.    [employer] Cheryl 2 CGM was downloaded today.  Data was reviewed from 10/19/24 to 11/1/24.  This showed some nocturnal hypoglycemia.  Some postprandial spikes - especially at BF.  Will decrease basal insulin.  Increase insulin at BF.  Time in range was 46%.    Orders:  -     POC Glucose, Blood  -     POC Glycosylated Hemoglobin (Hb A1C)  -     Microalbumin / Creatinine Urine Ratio - Urine, Clean Catch; Future    2. Benign hypertension  Assessment & Plan:  BP stable.    Continue current meds.  Monitor BP at home.      3. Hypercholesteremia  Assessment & Plan:  Continue statin.  Plan to check lipids next visit.      4. Hypothyroidism (acquired)  Assessment & Plan:  Continue T4 tx.  Check TSH.  Will send note about results.    Orders:  -     TSH; Future    5. Type 2 diabetes mellitus with hypoglycemia without coma, with long-term current use of  insulin  Assessment & Plan:  Continue CGM.        Current Outpatient Medications   Medication Instructions    amLODIPine (NORVASC) 10 MG tablet Take 1 tablet by mouth once daily    aspirin 81 mg, Daily    BD Pen Needle [place] 2nd Gen 32G X 4 MM misc USE PEN NEEDLES 4 TIMES DAILY    Continuous Blood Gluc  (FreeStyle Cheryl 2 [place]) device 1 each, Does not apply, Daily    Continuous Blood Gluc Sensor (FreeStyle Cheryl 2 Sensor) misc 1 each, Does not apply, Every 14 Days    HumaLOG KwikPen 100 UNIT/ML solution pen-injector INJECT 26 UNITS SUBCUTANEOUSLY THREE TIMES DAILY WITH MEALS    Lantus SoloStar 100 UNIT/ML injection pen INJECT 20 TO 30 UNITS SUBCUTANEOUSLY ONCE DAILY    levothyroxine (SYNTHROID, LEVOTHROID) 88 MCG tablet Take 1 tablet by mouth once daily    losartan (COZAAR) 100 mg, Oral, Daily    rosuvastatin (CRESTOR) 10 MG tablet Take 1 tablet by mouth once daily      Return in about 6 months (around 5/1/2025) for Recheck with A1c, CMP, lipid, TSH, microalbumin, foot exam.    Electronically signed by: Julian Miranda MD  11/01/2024

## 2024-11-01 NOTE — ASSESSMENT & PLAN NOTE
Diabetes is stable.  A1c not too bad.  We are limited by hypoglycemia.  Continue current treatment regimen.  Diabetes will be reassessed in 6 months.    She has come off the ozempic.  It was too expensive even with her insurance.    Encouraged eye exam.    FreeStyle Cheryl 2 CGM was downloaded today.  Data was reviewed from 10/19/24 to 11/1/24.  This showed some nocturnal hypoglycemia.  Some postprandial spikes - especially at BF.  Will decrease basal insulin.  Increase insulin at BF.  Time in range was 46%.

## 2024-11-02 LAB — TSH SERPL DL<=0.05 MIU/L-ACNC: 4.52 UIU/ML (ref 0.27–4.2)

## 2024-11-02 RX ORDER — LEVOTHYROXINE SODIUM 100 UG/1
100 TABLET ORAL DAILY
Qty: 90 TABLET | Refills: 3 | Status: SHIPPED | OUTPATIENT
Start: 2024-11-02

## 2025-03-05 RX ORDER — INSULIN GLARGINE 100 [IU]/ML
INJECTION, SOLUTION SUBCUTANEOUS
Qty: 27 ML | Refills: 3 | Status: SHIPPED | OUTPATIENT
Start: 2025-03-05

## 2025-03-05 NOTE — TELEPHONE ENCOUNTER
Rx Refill Note  Requested Prescriptions     Pending Prescriptions Disp Refills    Lantus SoloStar 100 UNIT/ML injection pen [Pharmacy Med Name: Lantus SoloStar 100 UNIT/ML Subcutaneous Solution Pen-injector] 15 mL 0     Sig: INJECT 20 TO 30 UNITS SUBCUTANEOUSLY ONCE DAILY      Last office visit with prescribing clinician: 11/1/2024   Last telemedicine visit with prescribing clinician: Visit date not found   Next office visit with prescribing clinician: 5/12/2025                         Would you like a call back once the refill request has been completed: [] Yes [] No    If the office needs to give you a call back, can they leave a voicemail: [] Yes [] No    Jeevan Quinones MA  03/05/25, 10:03 EST

## 2025-05-12 ENCOUNTER — OFFICE VISIT (OUTPATIENT)
Dept: ENDOCRINOLOGY | Facility: CLINIC | Age: 65
End: 2025-05-12
Payer: MEDICARE

## 2025-05-12 VITALS
WEIGHT: 192.4 LBS | BODY MASS INDEX: 34.09 KG/M2 | OXYGEN SATURATION: 96 % | SYSTOLIC BLOOD PRESSURE: 144 MMHG | HEIGHT: 63 IN | HEART RATE: 84 BPM | DIASTOLIC BLOOD PRESSURE: 86 MMHG

## 2025-05-12 DIAGNOSIS — E11.65 UNCONTROLLED TYPE 2 DIABETES MELLITUS WITH HYPERGLYCEMIA: Primary | ICD-10-CM

## 2025-05-12 DIAGNOSIS — E78.00 HYPERCHOLESTEREMIA: ICD-10-CM

## 2025-05-12 DIAGNOSIS — E03.9 HYPOTHYROIDISM (ACQUIRED): ICD-10-CM

## 2025-05-12 DIAGNOSIS — I10 BENIGN HYPERTENSION: ICD-10-CM

## 2025-05-12 LAB
EXPIRATION DATE: ABNORMAL
EXPIRATION DATE: ABNORMAL
GLUCOSE BLDC GLUCOMTR-MCNC: 254 MG/DL (ref 70–130)
HBA1C MFR BLD: 7.4 % (ref 4.5–5.7)
Lab: ABNORMAL
Lab: ABNORMAL

## 2025-05-12 PROCEDURE — 80053 COMPREHEN METABOLIC PANEL: CPT | Performed by: INTERNAL MEDICINE

## 2025-05-12 PROCEDURE — 95251 CONT GLUC MNTR ANALYSIS I&R: CPT | Performed by: INTERNAL MEDICINE

## 2025-05-12 PROCEDURE — 99214 OFFICE O/P EST MOD 30 MIN: CPT | Performed by: INTERNAL MEDICINE

## 2025-05-12 PROCEDURE — 84443 ASSAY THYROID STIM HORMONE: CPT | Performed by: INTERNAL MEDICINE

## 2025-05-12 PROCEDURE — 3051F HG A1C>EQUAL 7.0%<8.0%: CPT | Performed by: INTERNAL MEDICINE

## 2025-05-12 PROCEDURE — 36415 COLL VENOUS BLD VENIPUNCTURE: CPT | Performed by: INTERNAL MEDICINE

## 2025-05-12 PROCEDURE — 3079F DIAST BP 80-89 MM HG: CPT | Performed by: INTERNAL MEDICINE

## 2025-05-12 PROCEDURE — 1159F MED LIST DOCD IN RCRD: CPT | Performed by: INTERNAL MEDICINE

## 2025-05-12 PROCEDURE — 82043 UR ALBUMIN QUANTITATIVE: CPT | Performed by: INTERNAL MEDICINE

## 2025-05-12 PROCEDURE — 82570 ASSAY OF URINE CREATININE: CPT | Performed by: INTERNAL MEDICINE

## 2025-05-12 PROCEDURE — 1160F RVW MEDS BY RX/DR IN RCRD: CPT | Performed by: INTERNAL MEDICINE

## 2025-05-12 PROCEDURE — 83036 HEMOGLOBIN GLYCOSYLATED A1C: CPT | Performed by: INTERNAL MEDICINE

## 2025-05-12 PROCEDURE — 80061 LIPID PANEL: CPT | Performed by: INTERNAL MEDICINE

## 2025-05-12 PROCEDURE — 3077F SYST BP >= 140 MM HG: CPT | Performed by: INTERNAL MEDICINE

## 2025-05-12 RX ORDER — BLOOD-GLUCOSE SENSOR
EACH MISCELLANEOUS
COMMUNITY
End: 2025-05-12 | Stop reason: SDUPTHER

## 2025-05-12 RX ORDER — BLOOD-GLUCOSE SENSOR
1 EACH MISCELLANEOUS
Start: 2025-05-12

## 2025-05-12 NOTE — ASSESSMENT & PLAN NOTE
Diabetes is improving with treatment.   Continue current treatment regimen.  But adjust insulin doses.  Diabetes will be reassessed in 6 months.    FreeStyle Cheryl 2 CGM was downloaded today.  Data was reviewed from 4/29/25 to 5/12/25.  This showed some nocturnal hypoglycemia but postprandial spikes.  Will decrease basal insulin and increase mealtime insulin.  Time in range was 49%.

## 2025-05-12 NOTE — PROGRESS NOTES
"     Office Note      Date: 2025  Patient Name: Carmen Nava  MRN: [id number]  : 1960    Chief Complaint   Patient presents with    Diabetes     Uncontrolled Type II Diabetes Mellitus with Hyperglycemia    Hypothyroidism    Hyperlipidemia     Hypercholesteremia    Hypertension     Benign       History of Present Illness:   Carmen Nava is a 64 y.o. female who presents for Diabetes type 2. Diagnosed in: . Treated in past with oral agents. Current treatments: basal bolus insulin. Number of insulin shots per day: 4. Checks blood sugar 288 times a day - on FreeStyle Cheryl 2 CGM. Has low blood sugar: occ. Aspirin use: Yes. Statin use: Yes. ACE-I/ARB use: Yes. Changes in health since last visit: none. Last eye exam 2022.     She remains on T4 100mcg qd.  She is taking this correctly.  She isn't taking any interfering meds concurrently.  She hasn't noted any change in the size of her neck.  She denies any compressive sxs.  She denies any sxs of hypo- or hyperthyroidism at this time.    Subjective      Diabetic Complications:  Eyes: No  Kidneys: No  Feet: No  Heart: No    Diet and Exercise:  Meals per day: 3  Minutes of exercise per week: 0 mins.    Review of Systems:   Review of Systems   Constitutional: Negative.    Cardiovascular: Negative.    Gastrointestinal: Negative.    Endocrine: Negative.        The following portions of the patient's history were reviewed and updated as appropriate: allergies, current medications, past family history, past medical history, past social history, past surgical history, and problem list.    Objective     Visit Vitals  /86 (BP Location: Right arm, Patient Position: Sitting, Cuff Size: Adult)   Pulse 84   Ht 160 cm (62.99\")   Wt 87.3 kg (192 lb 6.4 oz)   LMP 2014 (Approximate)   SpO2 96%   BMI 34.09 kg/m²       Physical Exam:  Physical Exam  Constitutional:       Appearance: Normal appearance.   Cardiovascular:      Pulses:           Dorsalis pedis " "pulses are 1+ on the right side and 1+ on the left side.        Posterior tibial pulses are 1+ on the right side and 1+ on the left side.   Feet:      Right foot:      Protective Sensation: 5 sites tested.  5 sites sensed.      Skin integrity: Skin integrity normal.      Toenail Condition: Right toenails are normal.      Left foot:      Protective Sensation: 5 sites tested.  5 sites sensed.      Skin integrity: Skin integrity normal.      Toenail Condition: Left toenails are normal.   Neurological:      Mental Status: She is alert.         Labs:    HbA1c  Lab Results   Component Value Date    HGBA1C 7.4 (A) 05/12/2025       CMP  Lab Results   Component Value Date    GLUCOSE 299 (H) 11/16/2023    BUN 16 11/16/2023    CREATININE 0.84 11/16/2023    EGFRIFNONA 66 07/21/2021    BCR 19.0 11/16/2023    K 4.8 11/16/2023    CO2 26.0 11/16/2023    CALCIUM 10.0 11/16/2023    AST 18 11/16/2023    ALT 16 11/16/2023        Lipid Panel  Lab Results   Component Value Date    HDL Cholesterol 72 (H) 11/16/2023    LDL Cholesterol  73 11/16/2023    LDL/HDL Ratio 0.98 11/16/2023    Triglycerides 96 11/16/2023        TSH  Lab Results   Component Value Date    TSH 4.520 (H) 11/01/2024        Hemoglobin A1C  No components found for: \"HGBA1C\"     Microalbumin/Creatinine  Lab Results   Component Value Date    MALBCRERATIO  11/01/2024      Comment:      Unable to calculate    MICROALBUR <1.2 11/01/2024           Assessment / Plan      Assessment & Plan:  Diagnoses and all orders for this visit:    1. Uncontrolled type 2 diabetes mellitus with hyperglycemia (Primary)  Assessment & Plan:  Diabetes is improving with treatment.   Continue current treatment regimen.  But adjust insulin doses.  Diabetes will be reassessed in 6 months.    FreeStyle Cheryl 2 CGM was downloaded today.  Data was reviewed from 4/29/25 to 5/12/25.  This showed some nocturnal hypoglycemia but postprandial spikes.  Will decrease basal insulin and increase mealtime insulin.  " Time in range was 49%.    Orders:  -     POC Glucose, Blood  -     POC Glycosylated Hemoglobin (Hb A1C)    2. Benign hypertension  Assessment & Plan:  BP borderline.  Continue current meds.  Monitor BP at home.      3. Hypercholesteremia  Assessment & Plan:  Continue statin.  Check lipids.      4. Hypothyroidism (acquired)  Assessment & Plan:  Continue levothyroxine.  Check TSH.      Other orders  -     Continuous Glucose Sensor (FreeStyle Cheryl 2 Plus Sensor) misc; Use 1 each Every 15 (Fifteen) Days.      Current Outpatient Medications   Medication Instructions    amLODIPine (NORVASC) 10 MG tablet Take 1 tablet by mouth once daily    aspirin 81 mg, Daily    BD Pen Needle [place] 2nd Gen 32G X 4 MM misc USE PEN NEEDLES 4 TIMES DAILY    Continuous Blood Gluc  (FreeStyle Cheryl 2 Baker) device 1 each, Not Applicable, Daily    Continuous Glucose Sensor (FreeStyle Cheryl 2 Plus Sensor) misc 1 each, Not Applicable, Every 15 Days    HumaLOG KwikPen 100 UNIT/ML solution pen-injector INJECT 26 UNITS SUBCUTANEOUSLY THREE TIMES DAILY WITH MEALS    Lantus SoloStar 100 UNIT/ML injection pen INJECT 20 TO 30 UNITS SUBCUTANEOUSLY ONCE DAILY    levothyroxine (SYNTHROID, LEVOTHROID) 100 mcg, Oral, Daily    losartan (COZAAR) 100 mg, Oral, Daily    rosuvastatin (CRESTOR) 10 MG tablet Take 1 tablet by mouth once daily      Return in about 6 months (around 11/12/2025) for Recheck with A1c, TSH.    Electronically signed by: Julian Miranda MD  05/12/2025

## 2025-05-13 ENCOUNTER — RESULTS FOLLOW-UP (OUTPATIENT)
Dept: ENDOCRINOLOGY | Facility: CLINIC | Age: 65
End: 2025-05-13
Payer: MEDICARE

## 2025-05-13 LAB
ALBUMIN SERPL-MCNC: 4.2 G/DL (ref 3.5–5.2)
ALBUMIN UR-MCNC: <1.2 MG/DL
ALBUMIN/GLOB SERPL: 1.4 G/DL
ALP SERPL-CCNC: 111 U/L (ref 39–117)
ALT SERPL W P-5'-P-CCNC: 11 U/L (ref 1–33)
ANION GAP SERPL CALCULATED.3IONS-SCNC: 10.5 MMOL/L (ref 5–15)
AST SERPL-CCNC: 17 U/L (ref 1–32)
BILIRUB SERPL-MCNC: 0.8 MG/DL (ref 0–1.2)
BUN SERPL-MCNC: 20 MG/DL (ref 8–23)
BUN/CREAT SERPL: 19.2 (ref 7–25)
CALCIUM SPEC-SCNC: 10.2 MG/DL (ref 8.6–10.5)
CHLORIDE SERPL-SCNC: 101 MMOL/L (ref 98–107)
CHOLEST SERPL-MCNC: 152 MG/DL (ref 0–200)
CO2 SERPL-SCNC: 24.5 MMOL/L (ref 22–29)
CREAT SERPL-MCNC: 1.04 MG/DL (ref 0.57–1)
CREAT UR-MCNC: 98.4 MG/DL
EGFRCR SERPLBLD CKD-EPI 2021: 59.4 ML/MIN/1.73
GLOBULIN UR ELPH-MCNC: 3.1 GM/DL
GLUCOSE SERPL-MCNC: 228 MG/DL (ref 65–99)
HDLC SERPL-MCNC: 60 MG/DL (ref 40–60)
LDLC SERPL CALC-MCNC: 70 MG/DL (ref 0–100)
LDLC/HDLC SERPL: 1.11 {RATIO}
MICROALBUMIN/CREAT UR: NORMAL MG/G{CREAT}
POTASSIUM SERPL-SCNC: 4.5 MMOL/L (ref 3.5–5.2)
PROT SERPL-MCNC: 7.3 G/DL (ref 6–8.5)
SODIUM SERPL-SCNC: 136 MMOL/L (ref 136–145)
TRIGL SERPL-MCNC: 127 MG/DL (ref 0–150)
TSH SERPL DL<=0.05 MIU/L-ACNC: 0.87 UIU/ML (ref 0.27–4.2)
VLDLC SERPL-MCNC: 22 MG/DL (ref 5–40)

## 2025-05-29 ENCOUNTER — TELEPHONE (OUTPATIENT)
Dept: ENDOCRINOLOGY | Facility: CLINIC | Age: 65
End: 2025-05-29
Payer: MEDICARE

## 2025-05-29 RX ORDER — INSULIN ASPART INJECTION 100 [IU]/ML
26 INJECTION, SOLUTION SUBCUTANEOUS
Qty: 75 ML | Refills: 1 | Status: SHIPPED | OUTPATIENT
Start: 2025-05-29

## 2025-06-03 ENCOUNTER — TELEPHONE (OUTPATIENT)
Dept: ENDOCRINOLOGY | Facility: CLINIC | Age: 65
End: 2025-06-03

## 2025-06-03 NOTE — TELEPHONE ENCOUNTER
Hub staff attempted to follow warm transfer process and was unsuccessful     Caller: Carmen Nava    Relationship to patient: Self    Best call back number: [phone]    Patient is needing: PT CALLING TO ADVISE LOAN THAT THE MEDICATION  HUMALOG HAS BEEN COVERED BY HER INSURANCE. SHE WAS ABLE TO WORK THINGS OUT WITH HER INSURANCE